# Patient Record
Sex: FEMALE | Race: WHITE | Employment: FULL TIME | URBAN - METROPOLITAN AREA
[De-identification: names, ages, dates, MRNs, and addresses within clinical notes are randomized per-mention and may not be internally consistent; named-entity substitution may affect disease eponyms.]

---

## 2021-05-21 LAB
AVERAGE GLUCOSE: 108
HBA1C MFR BLD: 5.4 %

## 2021-06-01 ENCOUNTER — OFFICE VISIT (OUTPATIENT)
Dept: FAMILY MEDICINE CLINIC | Age: 51
End: 2021-06-01
Payer: COMMERCIAL

## 2021-06-01 VITALS
HEART RATE: 73 BPM | DIASTOLIC BLOOD PRESSURE: 71 MMHG | HEIGHT: 63 IN | OXYGEN SATURATION: 98 % | WEIGHT: 288 LBS | BODY MASS INDEX: 51.03 KG/M2 | SYSTOLIC BLOOD PRESSURE: 129 MMHG

## 2021-06-01 DIAGNOSIS — Z12.31 SCREENING MAMMOGRAM, ENCOUNTER FOR: ICD-10-CM

## 2021-06-01 DIAGNOSIS — Z76.89 ESTABLISHING CARE WITH NEW DOCTOR, ENCOUNTER FOR: Primary | ICD-10-CM

## 2021-06-01 DIAGNOSIS — E66.01 CLASS 3 SEVERE OBESITY DUE TO EXCESS CALORIES WITH SERIOUS COMORBIDITY AND BODY MASS INDEX (BMI) OF 50.0 TO 59.9 IN ADULT (HCC): ICD-10-CM

## 2021-06-01 PROCEDURE — 99204 OFFICE O/P NEW MOD 45 MIN: CPT | Performed by: FAMILY MEDICINE

## 2021-06-01 RX ORDER — LIRAGLUTIDE 6 MG/ML
18 INJECTION, SOLUTION SUBCUTANEOUS DAILY
Qty: 6 PEN | Refills: 5 | Status: SHIPPED | OUTPATIENT
Start: 2021-06-01 | End: 2021-07-01

## 2021-06-01 RX ORDER — CETIRIZINE HYDROCHLORIDE 10 MG/1
10 TABLET ORAL PRN
COMMUNITY

## 2021-06-01 RX ORDER — BUMETANIDE 2 MG/1
2 TABLET ORAL DAILY PRN
COMMUNITY

## 2021-06-01 RX ORDER — CYANOCOBALAMIN 500 UG/1
SPRAY NASAL
COMMUNITY
Start: 2021-05-21

## 2021-06-01 RX ORDER — PHENTERMINE HYDROCHLORIDE 37.5 MG/1
37.5 TABLET ORAL
Qty: 30 TABLET | Refills: 0 | Status: SHIPPED | OUTPATIENT
Start: 2021-06-01 | End: 2021-06-29 | Stop reason: SDUPTHER

## 2021-06-01 RX ORDER — VITAMIN A, VITAMIN C, VITAMIN D-3, VITAMIN E, VITAMIN B-1, VITAMIN B-2, NIACIN, VITAMIN B-6, CALCIUM, IRON, ZINC, COPPER 4000; 120; 400; 22; 1.84; 3; 20; 10; 1; 12; 200; 27; 25; 2 [IU]/1; MG/1; [IU]/1; MG/1; MG/1; MG/1; MG/1; MG/1; MG/1; UG/1; MG/1; MG/1; MG/1; MG/1
TABLET ORAL
COMMUNITY
Start: 2021-05-12 | End: 2021-07-02

## 2021-06-01 RX ORDER — ERGOCALCIFEROL 1.25 MG/1
CAPSULE ORAL
COMMUNITY
Start: 2021-05-12 | End: 2021-08-27

## 2021-06-01 RX ORDER — FAMOTIDINE 20 MG/1
TABLET, FILM COATED ORAL
COMMUNITY
Start: 2021-05-12

## 2021-06-01 RX ORDER — ALPRAZOLAM 1 MG/1
TABLET ORAL
COMMUNITY
Start: 2021-04-23 | End: 2021-06-29 | Stop reason: SDUPTHER

## 2021-06-01 RX ORDER — FLUOXETINE HYDROCHLORIDE 20 MG/1
CAPSULE ORAL
COMMUNITY
Start: 2021-05-12 | End: 2021-07-02

## 2021-06-01 RX ORDER — ATORVASTATIN CALCIUM 10 MG/1
TABLET, FILM COATED ORAL
COMMUNITY
End: 2021-07-02

## 2021-06-01 SDOH — ECONOMIC STABILITY: FOOD INSECURITY: WITHIN THE PAST 12 MONTHS, YOU WORRIED THAT YOUR FOOD WOULD RUN OUT BEFORE YOU GOT MONEY TO BUY MORE.: NEVER TRUE

## 2021-06-01 SDOH — ECONOMIC STABILITY: FOOD INSECURITY: WITHIN THE PAST 12 MONTHS, THE FOOD YOU BOUGHT JUST DIDN'T LAST AND YOU DIDN'T HAVE MONEY TO GET MORE.: NEVER TRUE

## 2021-06-01 ASSESSMENT — PATIENT HEALTH QUESTIONNAIRE - PHQ9
1. LITTLE INTEREST OR PLEASURE IN DOING THINGS: 0
2. FEELING DOWN, DEPRESSED OR HOPELESS: 0
SUM OF ALL RESPONSES TO PHQ QUESTIONS 1-9: 0
SUM OF ALL RESPONSES TO PHQ9 QUESTIONS 1 & 2: 0

## 2021-06-01 ASSESSMENT — SOCIAL DETERMINANTS OF HEALTH (SDOH): HOW HARD IS IT FOR YOU TO PAY FOR THE VERY BASICS LIKE FOOD, HOUSING, MEDICAL CARE, AND HEATING?: NOT HARD AT ALL

## 2021-06-01 NOTE — PROGRESS NOTES
[facility]  [address] [place] [address] [address] [facility] [address]  Dept: [phone]      Vikki Piedra is a 45 y.o. female who presents today for follow up on her  medical conditions as noted below. Chief Complaint   Patient presents with    New Patient       There is no problem list on file for this patient. History reviewed. No pertinent past medical history. History reviewed. No pertinent surgical history. History reviewed. No pertinent family history. Current Outpatient Medications   Medication Sig Dispense Refill    ALPRAZolam (XANAX) 1 MG tablet take 1 tablet by mouth twice a day      atorvastatin (LIPITOR) 10 MG tablet 1 tablet Orally Once a day for 90 days      cetirizine (ZYRTEC) 10 MG tablet Take 10 mg by mouth as needed      bumetanide (BUMEX) 2 MG tablet Take 2 mg by mouth daily as needed      NASCOBAL 500 MCG/0.1ML SOLN nasal spray instill 1 spray into each nostril every week      vitamin D (ERGOCALCIFEROL) 1.25 MG (18793 UT) CAPS capsule       famotidine (PEPCID) 20 MG tablet       FLUoxetine (PROZAC) 20 MG capsule       metFORMIN (GLUCOPHAGE) 500 MG tablet Take 500 mg by mouth 2 times daily (with meals)      Prenatal Vit-Fe Fumarate-FA (PRENATAL VITAMIN PLUS LOW IRON) 27-1 MG TABS       CALCIUM CITRATE PO Take by mouth      SAXENDA 18 MG/3ML SOPN Inject 18 mg into the skin daily 6 pen 5    phentermine (ADIPEX-P) 37.5 MG tablet Take 1 tablet by mouth every morning (before breakfast) for 30 days. 30 tablet 0    Insulin Pen Needle (NOVOTWIST) 32G X 5 MM MISC 1 each by Does not apply route daily 100 each 3     No current facility-administered medications for this visit.      ALLERGIES:    Allergies   Allergen Reactions    Latex Rash     Other reaction(s): Unknown    Red Dye Other (See Comments)     Itching rash    Topiramate Other (See Comments)    Trovafloxacin Itching and Nausea Only    Citalopram Itching and Rash celexa     Codeine Diarrhea, Other (See Comments) and Nausea And Vomiting    Hydrocodone Hives and Rash       Social History     Tobacco Use    Smoking status: Never Smoker    Smokeless tobacco: Never Used   Substance Use Topics    Alcohol use: Not on file        No results found for: LDLCALC, LDLCHOLESTEROL, HDL, BUN, CREATININE, GLUCOSE, LABA1C, LABMICR           Subjective:      HPI  She is being seen today to establish care as a new patient. Several years ago she had gastric bypass she did lose weight and it helped with a lot of her ongoing medical problems but unfortunately she has gained some weight back and is struggling to lose she is here that there is some other things she can do to help with that including an injectable and some medication she wonders if those are a possibility for her she recently had lab work done    Review of Systems:     Constitutional: Negative for fever, appetite change and fatigue. Family social and medical history reviewed and unchanged     HENT: Negative. Negative for nosebleeds, trouble swallowing and neck pain. Eyes: Negative for photophobia and visual disturbance. Respiratory: Negative. Negative for chest tightness and shortness of breath. Cardiovascular: Negative. Negative for chest pain and leg swelling. Gastrointestinal: Negative. Negative for abdominal pain and blood in stool. Endocrine: Negative for cold intolerance and polyuria. Genitourinary: Negative for dysuria and hematuria. Musculoskeletal: Negative. Skin: Negative for rash. Allergic/Immunologic: Negative. Neurological: Negative. Negative for dizziness, weakness and numbness. Hematological: Negative. Negative for adenopathy. Does not bruise/bleed easily. Psychiatric/Behavioral: Negative for sleep disturbance, dysphoric mood and  decreased concentration. The patient is not nervous/anxious. Objective:     Physical Exam:     Nursing note and vitals reviewed.   BP 129/71   Pulse 73   Ht 5' 3\" (1.6 m)   Wt 288 lb (130.6 kg)   SpO2 98%   BMI 51.02 kg/m²   Constitutional: She is oriented to person, place, and time. She   appears well-developed and well-nourished. HENT:   Head: Normocephalic and atraumatic. Right Ear: External ear normal. Tympanic membrane is not erythematous. No middle ear effusion. Left Ear: External ear normal. Tympanic membrane is not erythematous. No middle ear effusion. Nose: No mucosal edema. Mouth/Throat: Oropharynx is clear and moist. No posterior oropharyngeal erythema. Eyes: Conjunctivae and EOM are normal. Pupils are equal, round, and reactive to light. Neck: Normal range of motion. Neck supple. No thyromegaly present. Cardiovascular: Normal rate, regular rhythm and normal heart sounds. No murmur heard. Pulmonary/Chest: Effort normal and breath sounds normal. She has no wheezes. Shehas no rales. Abdominal: Soft. Bowel sounds are normal. She exhibits no distension and no mass. There is no tenderness. There is no rebound and no guarding. Genitourinary/Anorectal:deferred  Musculoskeletal: Normal range of motion. She exhibits no edema or tenderness. Lymphadenopathy: She has no cervical adenopathy. Neurological: She is alert and oriented to person, place, and time. She has normal reflexes. Skin: Skin is warm and dry. No rash noted. Psychiatric: She has a normal mood and affect. Her   behavior is normal.       Assessment:      1. Establishing care with new doctor, encounter for    2. Screening mammogram, encounter for    3. Class 3 severe obesity due to excess calories with serious comorbidity and body mass index (BMI) of 50.0 to 59.9 in adult [facility])          Plan:      Call or return to clinic prn if these symptoms worsen or fail to improve as anticipated. I have reviewed the instructions with the patient, answering all questions to her satisfaction.     Return in about 4 weeks (around 6/29/2021), or if symptoms worsen or fail to improve, for re-check. Orders Placed This Encounter   Procedures    DAVID DIGITAL SCREEN W OR WO CAD BILATERAL     Standing Status:   Future     Standing Expiration Date:   2022     Order Specific Question:   Reason for exam:     Answer:   screen     Orders Placed This Encounter   Medications    SAXENDA 18 MG/3ML SOPN     Sig: Inject 18 mg into the skin daily     Dispense:  6 pen     Refill:  5    phentermine (ADIPEX-P) 37.5 MG tablet     Sig: Take 1 tablet by mouth every morning (before breakfast) for 30 days.      Dispense:  30 tablet     Refill:  0    Insulin Pen Needle (NOVOTWIST) 32G X 5 MM MISC     Si each by Does not apply route daily     Dispense:  100 each     Refill:  3     Discussed using appetite suppressants and how the various medications work she would like to start on those she also is willing to do the low calorie diet and start exercising  And she needs to follow-up in the office in 1 month   electronically signed by Ten Montenegro DO on 2021 at 2:33 PM

## 2021-06-29 ENCOUNTER — OFFICE VISIT (OUTPATIENT)
Dept: FAMILY MEDICINE CLINIC | Age: 51
End: 2021-06-29
Payer: COMMERCIAL

## 2021-06-29 VITALS
DIASTOLIC BLOOD PRESSURE: 67 MMHG | BODY MASS INDEX: 47.12 KG/M2 | SYSTOLIC BLOOD PRESSURE: 121 MMHG | TEMPERATURE: 97.1 F | HEART RATE: 86 BPM | WEIGHT: 276 LBS | OXYGEN SATURATION: 95 % | HEIGHT: 64 IN

## 2021-06-29 DIAGNOSIS — F41.9 ANXIETY: ICD-10-CM

## 2021-06-29 DIAGNOSIS — E66.01 CLASS 3 SEVERE OBESITY DUE TO EXCESS CALORIES WITH SERIOUS COMORBIDITY AND BODY MASS INDEX (BMI) OF 50.0 TO 59.9 IN ADULT (HCC): Primary | ICD-10-CM

## 2021-06-29 PROCEDURE — 99213 OFFICE O/P EST LOW 20 MIN: CPT | Performed by: NURSE PRACTITIONER

## 2021-06-29 RX ORDER — PHENTERMINE HYDROCHLORIDE 37.5 MG/1
37.5 TABLET ORAL
Qty: 30 TABLET | Refills: 0 | Status: SHIPPED | OUTPATIENT
Start: 2021-06-29 | End: 2021-07-27 | Stop reason: SDUPTHER

## 2021-06-29 RX ORDER — ALPRAZOLAM 1 MG/1
TABLET ORAL
Qty: 30 TABLET | Refills: 0 | Status: SHIPPED | OUTPATIENT
Start: 2021-06-29 | End: 2021-08-02 | Stop reason: SDUPTHER

## 2021-06-29 ASSESSMENT — ENCOUNTER SYMPTOMS
ABDOMINAL PAIN: 0
DIARRHEA: 0
SHORTNESS OF BREATH: 0
CONSTIPATION: 0
SINUS PAIN: 0
COLOR CHANGE: 0
SINUS PRESSURE: 0
CHEST TIGHTNESS: 0

## 2021-06-29 NOTE — PROGRESS NOTES
Beto Garcia is a 45 y.o. female who presents in office today with Self medication specific follow up    Chief Complaint   Patient presents with    1 Month Follow-Up    Ear Fullness     possible allergies        History of Present Illness:     HPI    Here for Adipex follow up. Has lost 12 lbs. Changing diet, not craving junk food as much. Had some jittery feelings the first week but has worn off. She is only drinking water. Less fast food, eating more at home, and getting christensen faster. Was prescribed Saxenda but costs around $500. Wondering about PA. Knee surgery in 2 weeks - 7/14/21. Allergy, ear fullness. Taking xyzal and saline nasal spray. Has flonase at home, just hasn't used recently. Care gaps: COVID-19 vaccine:  Pfizer February and March  Colon CA screening:  Vaccines:   Hepatitis C/HIV screens:   Breast CA screening:    Appointment scheduled  OB/GYN, cervical CA screening:   Appointment scheduled    Health Maintenance Due   Topic Date Due    Hepatitis C screen  Never done    HIV screen  Never done    DTaP/Tdap/Td vaccine (2 - Td or Tdap) 12/22/2018    Breast cancer screen  Never done    Shingles Vaccine (1 of 2) Never done    Colon cancer screen colonoscopy  Never done        Patient Care Team:  Booker Guerrier DO as PCP - General (Family Medicine)  Booker Guerrier DO as PCP - [facility] Empaneled Provider    Reviewed     [x] Past Medical, Family, and Social History was reviewed per writer and does contribute to the patient presenting condition.     [x] Laboratory Results, Vital signs, Imaging, Active Problems, Immunizations, Current/Recently Discontinued Medications, Health Maintenance Activities Due, Referral Notes (if available) were reviewed per writer     [x] Reviewed Depression screening if taken or valid today or any other valid screening tool (others seen below) Interpretation of Total Score DepressionSeverity: 1-4 = Minimal depression, 5-9 = Mild depression, 10-14 = Moderate are moist.      Pharynx: Oropharynx is clear. Eyes:      Extraocular Movements: Extraocular movements intact. Conjunctiva/sclera: Conjunctivae normal.      Pupils: Pupils are equal, round, and reactive to light. Cardiovascular:      Rate and Rhythm: Normal rate and regular rhythm. Pulses: Normal pulses. Heart sounds: Normal heart sounds. Pulmonary:      Effort: Pulmonary effort is normal. No respiratory distress. Abdominal:      General: Bowel sounds are normal.      Palpations: Abdomen is soft. Musculoskeletal:         General: No swelling. Normal range of motion. Cervical back: Normal range of motion and neck supple. Skin:     General: Skin is warm and dry. Capillary Refill: Capillary refill takes less than 2 seconds. Neurological:      General: No focal deficit present. Mental Status: She is alert and oriented to person, place, and time. Psychiatric:         Mood and Affect: Mood normal.         Behavior: Behavior normal.         Thought Content: Thought content normal.         Judgment: Judgment normal.         Diagnoses / Plan:   1. Class 3 severe obesity due to excess calories with serious comorbidity and body mass index (BMI) of 50.0 to 59.9 in adult (HCC)  -     phentermine (ADIPEX-P) 37.5 MG tablet; Take 1 tablet by mouth every morning (before breakfast) for 30 days. , Disp-30 tablet, R-0Normal  2. Anxiety  -     ALPRAZolam (XANAX) 1 MG tablet; take 1 tablet by mouth daily, Disp-30 tablet, R-0Normal     Discussed resuming flonase for ear pressure. Encouraged healthy diet and exercise. Call office with any new or worsening symptoms or concerns. Return in about 4 weeks (around 7/27/2021) for Med Check, weight check. Electronically signed by VAL Lebron CNP on 6/29/2021 at 3:03 PM    Note is dictated utilizing voice recognition software. Unfortunately this leads to occasional typographical errors.  Please contact our office if you have any questions.

## 2021-07-02 RX ORDER — FERROUS SULFATE 325(65) MG
TABLET ORAL
Qty: 12 TABLET | Refills: 0 | Status: SHIPPED | OUTPATIENT
Start: 2021-07-02

## 2021-07-02 RX ORDER — LEVOTHYROXINE SODIUM 0.05 MG/1
TABLET ORAL
Qty: 126 TABLET | Refills: 0 | Status: SHIPPED | OUTPATIENT
Start: 2021-07-02 | End: 2021-07-28

## 2021-07-02 RX ORDER — FLUOXETINE HYDROCHLORIDE 20 MG/1
CAPSULE ORAL
Qty: 84 CAPSULE | Refills: 0 | Status: SHIPPED | OUTPATIENT
Start: 2021-07-02 | End: 2021-07-26

## 2021-07-02 RX ORDER — ATORVASTATIN CALCIUM 10 MG/1
TABLET, FILM COATED ORAL
Qty: 28 TABLET | Refills: 0 | Status: SHIPPED | OUTPATIENT
Start: 2021-07-02 | End: 2021-07-28

## 2021-07-02 RX ORDER — VITAMIN A, VITAMIN C, VITAMIN D-3, VITAMIN E, VITAMIN B-1, VITAMIN B-2, NIACIN, VITAMIN B-6, CALCIUM, IRON, ZINC, COPPER 4000; 120; 400; 22; 1.84; 3; 20; 10; 1; 12; 200; 27; 25; 2 [IU]/1; MG/1; [IU]/1; MG/1; MG/1; MG/1; MG/1; MG/1; MG/1; UG/1; MG/1; MG/1; MG/1; MG/1
TABLET ORAL
Qty: 28 TABLET | Refills: 0 | Status: SHIPPED | OUTPATIENT
Start: 2021-07-02 | End: 2021-07-26

## 2021-07-12 ENCOUNTER — TELEPHONE (OUTPATIENT)
Dept: FAMILY MEDICINE CLINIC | Age: 51
End: 2021-07-12

## 2021-07-12 DIAGNOSIS — Z01.818 PRE-OP TESTING: Primary | ICD-10-CM

## 2021-07-12 NOTE — TELEPHONE ENCOUNTER
patient needs clearance for surgery  Dr Maria Teresa Garces office  Surgery was schedule for 7/14/21 was moved until 7/21/21 at [facility] . Per Dr Enmanuel Carreon patient needs chest xray, urine mrsa , done before she is cleared. Patient was informed will  get testing done tomorrow. All orders faxed to the wellness center on [facility].   Clearance scan in epic.    Lab orders faxed to [phone]  Radiology  Faxed to [phone]

## 2021-07-13 ENCOUNTER — TELEPHONE (OUTPATIENT)
Dept: FAMILY MEDICINE CLINIC | Age: 51
End: 2021-07-13

## 2021-07-14 DIAGNOSIS — Z01.818 PRE-OP TESTING: ICD-10-CM

## 2021-07-15 DIAGNOSIS — Z01.818 PRE-OP TESTING: ICD-10-CM

## 2021-07-27 ENCOUNTER — TELEMEDICINE (OUTPATIENT)
Dept: FAMILY MEDICINE CLINIC | Age: 51
End: 2021-07-27
Payer: COMMERCIAL

## 2021-07-27 DIAGNOSIS — E03.9 ACQUIRED HYPOTHYROIDISM: Primary | ICD-10-CM

## 2021-07-27 DIAGNOSIS — E66.01 CLASS 3 SEVERE OBESITY DUE TO EXCESS CALORIES WITH SERIOUS COMORBIDITY AND BODY MASS INDEX (BMI) OF 50.0 TO 59.9 IN ADULT (HCC): ICD-10-CM

## 2021-07-27 PROCEDURE — 99213 OFFICE O/P EST LOW 20 MIN: CPT | Performed by: FAMILY MEDICINE

## 2021-07-27 RX ORDER — PHENTERMINE HYDROCHLORIDE 37.5 MG/1
37.5 TABLET ORAL
Qty: 30 TABLET | Refills: 0 | Status: SHIPPED | OUTPATIENT
Start: 2021-07-27 | End: 2021-08-26

## 2021-07-27 ASSESSMENT — PATIENT HEALTH QUESTIONNAIRE - PHQ9
2. FEELING DOWN, DEPRESSED OR HOPELESS: 0
SUM OF ALL RESPONSES TO PHQ9 QUESTIONS 1 & 2: 0
SUM OF ALL RESPONSES TO PHQ QUESTIONS 1-9: 0
SUM OF ALL RESPONSES TO PHQ QUESTIONS 1-9: 0
1. LITTLE INTEREST OR PLEASURE IN DOING THINGS: 0
SUM OF ALL RESPONSES TO PHQ QUESTIONS 1-9: 0

## 2021-07-27 NOTE — PROGRESS NOTES
2021    TELEHEALTH EVALUATION -- Audio/Visual (During ZNEYO-69 public health emergency)    HPI:    Brodie Tavares (:  1970) has requested an audio/video evaluation for the following concern(s):    Being seen today for couple issues 1 thyroid disease she has not had her labs done and sometimes in and she wanted a refill on meds she her last laboratory studies were done in  she takes 4-1/2 Synthroid 50 mcg  She also wanted a refill of Xanax but she got her last refill for 30pills and  she states she only takes it to help her sleep previous she had been taking 1 mg twice daily she recently had knee surgery done and is also be taking pain mild pills she also was doing a refill diet meds because she had started on that process and in order to continue her 3months wanted to get that refill and she will start working on that when she can get more active with physical therapy    Review of Systems    Prior to Visit Medications    Medication Sig Taking? Authorizing Provider   phentermine (ADIPEX-P) 37.5 MG tablet Take 1 tablet by mouth every morning (before breakfast) for 30 days.  Yes Camelia Cagle, DO   Prenatal Vit-Fe Fumarate-FA (PRENATAL VITAMIN PLUS LOW IRON) 27-1 MG TABS TAKE 1 TABLET AT BEDTIME  Camelia Cagle DO   FLUoxetine (PROZAC) 20 MG capsule TAKE (3) CAPSULES EVERY MORNING  Camelia Cagle DO   levothyroxine (SYNTHROID) 50 MCG tablet TAKE 4 & 1/2 TABLETS EVERY MORNING  Camelia Cagle DO   atorvastatin (LIPITOR) 10 MG tablet TAKE 1 TABLET AT BEDTIME  Camelia Cagle DO   FEROSUL 325 (65 Fe) MG tablet TAKE 1 TABLET THREE TIMES A WEEK  Camelia Cagle DO   ALPRAZolam (XANAX) 1 MG tablet take 1 tablet by mouth daily  Morna Blizzard, APRN - CNP   cetirizine (ZYRTEC) 10 MG tablet Take 10 mg by mouth as needed  Historical Provider, MD   bumetanide (BUMEX) 2 MG tablet Take 2 mg by mouth daily as needed  Historical Provider, MD   NASCOBAL 500 MCG/0.1ML SOLN nasal spray instill 1 spray into each nostril Normocephalic, atraumatic. [] Abnormal   [x] Mouth/Throat: Mucous membranes are moist.     External Ears [x] Normal  [] Abnormal-     Neck: [x] No visualized mass     Pulmonary/Chest: [x] Respiratory effort normal.  [x] No visualized signs of difficulty breathing or respiratory distress        [] Abnormal-      Musculoskeletal:   [x] Normal gait with no signs of ataxia         [x] Normal range of motion of neck        [] Abnormal-       Neurological:        [x] No Facial Asymmetry (Cranial nerve 7 motor function) (limited exam to video visit)          [x] No gaze palsy        [] Abnormal-         Skin:        [x] No significant exanthematous lesions or discoloration noted on facial skin         [] Abnormal-            Psychiatric:       [x] Normal Affect [x] No Hallucinations        [] Abnormal-     Other pertinent observable physical exam findings-     ASSESSMENT/PLAN:   Diagnosis Orders   1. Acquired hypothyroidism  T4, Free    TSH without Reflex    Thyroid Peroxidase Antibody   2. Class 3 severe obesity due to excess calories with serious comorbidity and body mass index (BMI) of 50.0 to 59.9 in adult ([facility])  phentermine (ADIPEX-P) 37.5 MG tablet      Orders Placed This Encounter   Procedures    T4, Free    TSH without Reflex    Thyroid Peroxidase Antibody     Requested Prescriptions     Signed Prescriptions Disp Refills    phentermine (ADIPEX-P) 37.5 MG tablet 30 tablet 0     Sig: Take 1 tablet by mouth every morning (before breakfast) for 30 days. No follow-ups on file. Jg Pineda is a 45 y.o. female being evaluated by a Virtual Visit (video visit) encounter to address concerns as mentioned above. A caregiver was present when appropriate. Due to this being a TeleHealth encounter (During CADOV-71 public health emergency), evaluation of the following organ systems was limited: Vitals/Constitutional/EENT/Resp/CV/GI//MS/Neuro/Skin/Heme-Lymph-Imm.   Pursuant to the emergency declaration under the [facility] [address] waiver authority and the [employer] and [employer] Act, this Virtual Visit was conducted with patient's (and/or legal guardian's) consent, to reduce the patient's risk of exposure to COVID-19 and provide necessary medical care. The patient (and/or legal guardian) has also been advised to contact this office for worsening conditions or problems, and seek emergency medical treatment and/or call 911 if deemed necessary. Patient identification was verified at the start of the visit: Yes    Total time spent on this encounter: Not billed by time    Services were provided through a video synchronous discussion virtually to substitute for in-person clinic visit. Patient and provider were located at their individual homes. --William Trevino DO on 7/27/2021 at 10:25 AM    An electronic signature was used to authenticate this note.

## 2021-07-28 LAB
T4 FREE: NORMAL
TSH SERPL DL<=0.05 MIU/L-ACNC: NORMAL M[IU]/L

## 2021-07-28 RX ORDER — ATORVASTATIN CALCIUM 10 MG/1
TABLET, FILM COATED ORAL
Qty: 28 TABLET | Refills: 0 | Status: SHIPPED | OUTPATIENT
Start: 2021-07-28 | End: 2021-08-23

## 2021-07-28 RX ORDER — LEVOTHYROXINE SODIUM 0.05 MG/1
TABLET ORAL
Qty: 126 TABLET | Refills: 0 | Status: SHIPPED | OUTPATIENT
Start: 2021-07-28 | End: 2021-07-31 | Stop reason: SDUPTHER

## 2021-07-29 DIAGNOSIS — E03.9 ACQUIRED HYPOTHYROIDISM: ICD-10-CM

## 2021-07-30 ENCOUNTER — TELEPHONE (OUTPATIENT)
Dept: FAMILY MEDICINE CLINIC | Age: 51
End: 2021-07-30

## 2021-07-30 NOTE — TELEPHONE ENCOUNTER
Patient calling for thyroid results because pharmacy is asking for refills and she wants to make sure its the right dosage she should be taken Please advise.

## 2021-07-31 RX ORDER — LEVOTHYROXINE SODIUM 0.05 MG/1
200 TABLET ORAL DAILY
Qty: 120 TABLET | Refills: 11 | Status: SHIPPED | OUTPATIENT
Start: 2021-07-31 | End: 2021-08-02 | Stop reason: SDUPTHER

## 2021-08-02 DIAGNOSIS — F41.9 ANXIETY: ICD-10-CM

## 2021-08-02 RX ORDER — ALPRAZOLAM 1 MG/1
TABLET ORAL
Qty: 30 TABLET | Refills: 0 | Status: SHIPPED | OUTPATIENT
Start: 2021-08-02 | End: 2021-09-16

## 2021-08-02 RX ORDER — LEVOTHYROXINE SODIUM 0.05 MG/1
200 TABLET ORAL DAILY
Qty: 120 TABLET | Refills: 11 | Status: SHIPPED | OUTPATIENT
Start: 2021-08-02 | End: 2022-06-24

## 2021-08-02 NOTE — TELEPHONE ENCOUNTER
Patient stated she needs a new prescription sent over for her synthoid with the new directions          [facility] pharmacy        [phone]

## 2021-08-02 NOTE — TELEPHONE ENCOUNTER
Patient wants to know did you call in a new prescription for her synthroid. Patient stated she is no longer taking her pain medication anymore for her knee,      She wants to know can you refill her   Xanax.       Please advise

## 2021-08-05 ENCOUNTER — PATIENT MESSAGE (OUTPATIENT)
Dept: FAMILY MEDICINE CLINIC | Age: 51
End: 2021-08-05

## 2021-08-05 NOTE — TELEPHONE ENCOUNTER
From: Myles Das  To: Hetal Hayden DO  Sent: 8/5/2021 12:40 AM EDT  Subject: Prescription Question    I had my total knee surgery on July 21. Dr. Delia Souza told me to check with you to see when or if it is OK to start taking my phetermine again and take my butemide/bumped for water retention as needed.

## 2021-08-27 RX ORDER — ERGOCALCIFEROL 1.25 MG/1
CAPSULE ORAL
Qty: 12 CAPSULE | Refills: 0 | Status: SHIPPED | OUTPATIENT
Start: 2021-08-27 | End: 2021-09-20

## 2021-08-27 NOTE — TELEPHONE ENCOUNTER
Gabriel Castro is calling to request a refill on the following medication(s):    Last Visit Date (If Applicable):  8/21/5082    Next Visit Date:    Visit date not found    Medication Request:  Requested Prescriptions     Pending Prescriptions Disp Refills    vitamin D (ERGOCALCIFEROL) 1.25 MG (48260 UT) CAPS capsule [Pharmacy Med Name: VITAMIN D2 1.25MG(50,000 UNIT)] 12 capsule 0     Sig: TAKE (1) CAPSULE ON MONDAY, [address]

## 2021-09-13 DIAGNOSIS — F41.9 ANXIETY: ICD-10-CM

## 2021-09-14 NOTE — TELEPHONE ENCOUNTER
Kar Jaramillo is calling to request a refill on the following medication(s):    Last Visit Date (If Applicable):  0/84/5526    Next Visit Date:    Visit date not found    Medication Request:  Requested Prescriptions     Pending Prescriptions Disp Refills    ALPRAZolam (XANAX) 1 MG tablet [Pharmacy Med Name: ALPRAZOLAM 1 MG TABLET] 30 tablet      Sig: take 1 tablet by mouth once daily

## 2021-09-16 RX ORDER — ALPRAZOLAM 1 MG/1
TABLET ORAL
Qty: 30 TABLET | Refills: 0 | Status: SHIPPED | OUTPATIENT
Start: 2021-09-16 | End: 2021-10-16

## 2021-09-20 RX ORDER — ERGOCALCIFEROL 1.25 MG/1
CAPSULE ORAL
Qty: 12 CAPSULE | Refills: 0 | Status: SHIPPED | OUTPATIENT
Start: 2021-09-20 | End: 2021-10-18

## 2021-09-20 NOTE — TELEPHONE ENCOUNTER
Myles Das is calling to request a refill on the following medication(s):    Last Visit Date (If Applicable):  1/26/3490    Next Visit Date:    Visit date not found    Medication Request:  Requested Prescriptions     Pending Prescriptions Disp Refills    vitamin D (ERGOCALCIFEROL) 1.25 MG (47633 UT) CAPS capsule [Pharmacy Med Name: VITAMIN D2 1.25MG(50,000 UNIT)] 12 capsule 0     Sig: TAKE (1) CAPSULE ON MONDAY, [address].

## 2021-09-24 NOTE — TELEPHONE ENCOUNTER
Tali Gongora is calling to request a refill on the following medication(s):    Last Visit Date (If Applicable):  8/02/7596    Next Visit Date:    Visit date not found    Medication Request:  Requested Prescriptions     Pending Prescriptions Disp Refills    metFORMIN (GLUCOPHAGE) 500 MG tablet [Pharmacy Med Name: METFORMIN  MG TABLET] 56 tablet 0     Sig: TAKE (1) TABLET EVERY MORNING AND BEDTIME

## 2021-11-11 DIAGNOSIS — Z12.31 SCREENING MAMMOGRAM, ENCOUNTER FOR: ICD-10-CM

## 2021-11-19 DIAGNOSIS — F41.9 ANXIETY: Primary | ICD-10-CM

## 2021-11-19 RX ORDER — ALPRAZOLAM 1 MG/1
1 TABLET ORAL NIGHTLY PRN
Qty: 30 TABLET | Refills: 0 | Status: SHIPPED | OUTPATIENT
Start: 2021-11-19 | End: 2021-12-28

## 2021-11-19 NOTE — TELEPHONE ENCOUNTER
Gayathri Patel is calling to request a refill on the following medication(s):    Last Visit Date (If Applicable):  1/35/7629    Next Visit Date:    Visit date not found    Medication Request:  Requested Prescriptions     Pending Prescriptions Disp Refills    ALPRAZolam (XANAX) 1 MG tablet 30 tablet 0     Sig: Take 1 tablet by mouth nightly as needed for Sleep for up to 30 days.

## 2022-01-24 DIAGNOSIS — F41.9 ANXIETY: ICD-10-CM

## 2022-01-24 RX ORDER — ALPRAZOLAM 1 MG/1
TABLET ORAL
Qty: 30 TABLET | Refills: 0 | Status: SHIPPED | OUTPATIENT
Start: 2022-01-24 | End: 2022-02-26 | Stop reason: SDUPTHER

## 2022-01-24 NOTE — TELEPHONE ENCOUNTER
Pharm requested    Health Maintenance   Topic Date Due    Hepatitis C screen  Never done    HIV screen  Never done    Colon cancer screen colonoscopy  Never done    DTaP/Tdap/Td vaccine (2 - Td or Tdap) 12/22/2018    Shingles Vaccine (1 of 2) Never done    COVID-19 Vaccine (3 - Booster for Pfizer series) 08/06/2021    Flu vaccine (1) 09/01/2021    Potassium monitoring  09/12/2021    Creatinine monitoring  09/12/2021    Lipid screen  05/21/2022    Depression Screen  07/27/2022    Cervical cancer screen  09/10/2023    Breast cancer screen  11/04/2023    Diabetes screen  05/21/2024    Hepatitis A vaccine  Aged Out    Hepatitis B vaccine  Aged Out    Hib vaccine  Aged Out    Meningococcal (ACWY) vaccine  Aged Out    Pneumococcal 0-64 years Vaccine  Aged Out             (applicable per patient's age: Cancer Screenings, Depression Screening, Fall Risk Screening, Immunizations)    Hemoglobin A1C (%)   Date Value   05/21/2021 5.4      (goal A1C is < 7)   (goal LDL is <100) need 30-50% reduction from baseline     BP Readings from Last 3 Encounters:   06/29/21 121/67   06/01/21 129/71    (goal /80)      All Future Testing planned in [facility]:      Next Visit Date:  No future appointments.          Patient Active Problem List:     Anxiety     Morbid obesity ([address]

## 2022-01-27 ENCOUNTER — TELEMEDICINE (OUTPATIENT)
Dept: FAMILY MEDICINE CLINIC | Age: 52
End: 2022-01-27
Payer: COMMERCIAL

## 2022-01-27 DIAGNOSIS — R53.83 FATIGUE, UNSPECIFIED TYPE: ICD-10-CM

## 2022-01-27 DIAGNOSIS — E03.9 ACQUIRED HYPOTHYROIDISM: Primary | ICD-10-CM

## 2022-01-27 DIAGNOSIS — E66.01 CLASS 3 SEVERE OBESITY DUE TO EXCESS CALORIES WITH SERIOUS COMORBIDITY AND BODY MASS INDEX (BMI) OF 50.0 TO 59.9 IN ADULT (HCC): ICD-10-CM

## 2022-01-27 DIAGNOSIS — F51.01 PRIMARY INSOMNIA: ICD-10-CM

## 2022-01-27 PROCEDURE — 99214 OFFICE O/P EST MOD 30 MIN: CPT | Performed by: FAMILY MEDICINE

## 2022-01-27 RX ORDER — PHENTERMINE HYDROCHLORIDE 37.5 MG/1
37.5 TABLET ORAL
Qty: 30 TABLET | Refills: 0 | Status: SHIPPED | OUTPATIENT
Start: 2022-01-27 | End: 2022-02-24 | Stop reason: SDUPTHER

## 2022-01-27 RX ORDER — SEMAGLUTIDE 0.25 MG/.5ML
0.25 INJECTION, SOLUTION SUBCUTANEOUS
Qty: 2 ML | Refills: 1 | Status: SHIPPED | OUTPATIENT
Start: 2022-01-27 | End: 2022-02-24

## 2022-01-27 ASSESSMENT — PATIENT HEALTH QUESTIONNAIRE - PHQ9
SUM OF ALL RESPONSES TO PHQ QUESTIONS 1-9: 0
SUM OF ALL RESPONSES TO PHQ QUESTIONS 1-9: 0
2. FEELING DOWN, DEPRESSED OR HOPELESS: 0
SUM OF ALL RESPONSES TO PHQ9 QUESTIONS 1 & 2: 0
SUM OF ALL RESPONSES TO PHQ QUESTIONS 1-9: 0
1. LITTLE INTEREST OR PLEASURE IN DOING THINGS: 0
SUM OF ALL RESPONSES TO PHQ QUESTIONS 1-9: 0

## 2022-01-27 NOTE — PROGRESS NOTES
2022    TELEHEALTH EVALUATION -- Audio/Visual (During [facility]-04 public health emergency)    HPI:    Roxanna Piedra (:  1970) has requested an audio/video evaluation for the following concern(s):    Being seen today with multiple issues she would like to go back on something to help her with weight management she tried Adipex in the past and it did help her she also wanted Raynald [place] but it was way too expensive but she heard there is a new drug on the market that is injectable and she would like to try that  She states she is been really tired lately and she would like to get her thyroid checked I did decrease her dose because she her TSH was way too low and she never followed up on getting that rechecked  She does take a Xanax every night at bed to help her sleep and wondered if she could get refills on it so she does not have the pulmonary month      Review of Systems    Prior to Visit Medications    Medication Sig Taking? Authorizing Provider   phentermine (ADIPEX-P) 37.5 MG tablet Take 1 tablet by mouth every morning (before breakfast) for 30 days. Yes Camelia Cagle DO   WEGOVY 0.25 MG/0.5ML SOAJ SC injection Inject 0.25 mg into the skin every 7 days Yes Camelia Cagle DO   ALPRAZolam (XANAX) 1 MG tablet take 1 tablet by mouth at bedtime if needed for sleep  Camelia Cagle DO   clindamycin (CLEOCIN T) 1 % external solution Apply 1 UNSPECIFIED topically 2 times daily  Christiano Perez MD   metFORMIN (GLUCOPHAGE) 500 MG tablet TAKE (1) TABLET EVERY MORNING AND BEDTIME  Camelia Cagle DO   vitamin D (ERGOCALCIFEROL) 1.25 MG (97262 UT) CAPS capsule TAKE (1) CAPSULE ON MONDAY, [address]   Camelia Cagle DO   atorvastatin (LIPITOR) 10 MG tablet TAKE 1 TABLET AT BEDTIME  Camelia Cagle DO   Prenatal Vit-Fe Fumarate-FA (PRENATAL VITAMIN PLUS LOW IRON) 27-1 MG TABS TAKE 1 TABLET AT BEDTIME  Camelia Cagle DO   FLUoxetine (PROZAC) 20 MG capsule TAKE (3) CAPSULES EVERY MORNING  Camelia Cagle DO levothyroxine (SYNTHROID) 50 MCG tablet Take 4 tablets by mouth Daily  Camelia Cagle DO   FEROSUL 325 (65 Fe) MG tablet TAKE 1 TABLET THREE TIMES A WEEK  Camelia Cagle DO   cetirizine (ZYRTEC) 10 MG tablet Take 10 mg by mouth as needed  Historical Provider, MD   bumetanide (BUMEX) 2 MG tablet Take 2 mg by mouth daily as needed  Historical Provider, MD   NASCOBAL 500 MCG/0.1ML SOLN nasal spray instill 1 spray into each nostril every week  Historical Provider, MD   famotidine (PEPCID) 20 MG tablet   Historical Provider, MD   CALCIUM CITRATE PO Take by mouth  Historical Provider, MD   Insulin Pen Needle (NOVOTWIST) 32G X 5 MM MISC 1 each by Does not apply route daily  Camelia Cagle DO       Social History     Tobacco Use    Smoking status: Never Smoker    Smokeless tobacco: Never Used   Substance Use Topics    Alcohol use: Not on file    Drug use: Not on file        Allergies   Allergen Reactions    Latex Rash     Other reaction(s): Unknown    Red Dye Other (See Comments)     Itching rash    Topiramate Other (See Comments)    Trovafloxacin Itching and Nausea Only    Citalopram Itching and Rash     celexa     Codeine Diarrhea, Other (See Comments) and Nausea And Vomiting    Hydrocodone Hives and Rash   , History reviewed. No pertinent past medical history. , History reviewed. No pertinent surgical history. ,   Social History     Tobacco Use    Smoking status: Never Smoker    Smokeless tobacco: Never Used   Substance Use Topics    Alcohol use: Not on file    Drug use: Not on file       PHYSICAL EXAMINATION:  [ INSTRUCTIONS:  \"[x]\" Indicates a positive item  \"[]\" Indicates a negative item  -- DELETE ALL ITEMS NOT EXAMINED]  Vital Signs: (As obtained by patient/caregiver or practitioner observation)    Blood pressure-  Heart rate-    Respiratory rate-    Temperature-  Pulse oximetry-     Constitutional: [x] Appears well-developed and well-nourished [x] No apparent distress      [] Abnormal-   Mental status  [x] Alert and awake  [x] Oriented to person/place/time [x]Able to follow commands      Eyes:  EOM    [x]  Normal  [] Abnormal-  Sclera  [x]  Normal  [] Abnormal -         Discharge [x]  None visible  [] Abnormal -    HENT:   [x] Normocephalic, atraumatic. [] Abnormal   [x] Mouth/Throat: Mucous membranes are moist.     External Ears [x] Normal  [] Abnormal-     Neck: [x] No visualized mass     Pulmonary/Chest: [x] Respiratory effort normal.  [x] No visualized signs of difficulty breathing or respiratory distress        [] Abnormal-      Musculoskeletal:   [x] Normal gait with no signs of ataxia         [x] Normal range of motion of neck        [] Abnormal-       Neurological:        [x] No Facial Asymmetry (Cranial nerve 7 motor function) (limited exam to video visit)          [x] No gaze palsy        [] Abnormal-         Skin:        [x] No significant exanthematous lesions or discoloration noted on facial skin         [] Abnormal-            Psychiatric:       [x] Normal Affect [x] No Hallucinations        [] Abnormal-     Other pertinent observable physical exam findings-     ASSESSMENT/PLAN:   Diagnosis Orders   1. Acquired hypothyroidism  T4, Free    TSH without Reflex    CBC Auto Differential   2. Class 3 severe obesity due to excess calories with serious comorbidity and body mass index (BMI) of 50.0 to 59.9 in adult (HCC)  phentermine (ADIPEX-P) 37.5 MG tablet    WEGOVY 0.25 MG/0.5ML SOAJ SC injection   3. Fatigue, unspecified type  CBC Auto Differential   4. Primary insomnia            Address all the issues with the patient  Discussed all the medications with the patient put in the lab order for her to check for reasons of fatigue  Discussed risk side effects benefits of using all the medications  She is to follow-up in the office or virtually in 4 weeks follow calorie diet and exercise  Return in about 4 weeks (around 2/24/2022) for re-check.     Donta Doyle is a 45 y.o. female being evaluated by a Virtual Visit (video visit) encounter to address concerns as mentioned above. A caregiver was present when appropriate. Due to this being a TeleHealth encounter (During [facility]-81 public health emergency), evaluation of the following organ systems was limited: Vitals/Constitutional/EENT/Resp/CV/GI//MS/Neuro/Skin/Heme-Lymph-Imm. Pursuant to the emergency declaration under the [address] [place] [address] and the [employer] and [employer] Act, this Virtual Visit was conducted with patient's (and/or legal guardian's) consent, to reduce the patient's risk of exposure to COVID-19 and provide necessary medical care. The patient (and/or legal guardian) has also been advised to contact this office for worsening conditions or problems, and seek emergency medical treatment and/or call 911 if deemed necessary. Patient identification was verified at the start of the visit: Yes    Total time spent on this encounter: Not billed by time    Services were provided through a video synchronous discussion virtually to substitute for in-person clinic visit. Patient and provider were located at their individual homes. --Vasquez Salcedo DO on 1/27/2022 at 5:08 PM    An electronic signature was used to authenticate this note.

## 2022-02-09 ENCOUNTER — TELEPHONE (OUTPATIENT)
Dept: FAMILY MEDICINE CLINIC | Age: 52
End: 2022-02-09

## 2022-02-09 NOTE — TELEPHONE ENCOUNTER
----- Message from Ellen Simmons sent at 2/9/2022  2:35 PM EST -----  Subject: Medication Problem    QUESTIONS  Name of Medication? WEGOVY 0.25 MG/0.5ML SOAJ SC injection  Patient-reported dosage and instructions? Inject 0.25 mg into the skin   every 7 days  What question or problem do you have with the medication? Pt states her   insurance is not covering the medication and they would need an appeal   letter sent. The appeal letter can be sent to [employer] at [facility] [phone] attention? Benefit Coverage Review   Department. Preferred Pharmacy? [facility] [address] [facility] [phone]  Pharmacy phone number (if available)? [phone]  Additional Information for Provider? Pt states the provider could also   call [employer] at [phone] ask for added coverage review to   explain why medication is needed. Pt would also like to receive a call   back with a status update.  ---------------------------------------------------------------------------  --------------  CALL BACK INFO  What is the best way for the office to contact you? OK to leave message on   voicemail  Preferred Call Back Phone Number? [phone]  ---------------------------------------------------------------------------  --------------  SCRIPT ANSWERS  Relationship to Patient?  Self

## 2022-02-11 NOTE — TELEPHONE ENCOUNTER
She needs to download the new co-pay card off of the Internet and take it to the pharmacy and the pharmacy has to run it with 03 code these patients are not doing that and the pharmacy is not done running the correct code.   Insurances will not cover this medication

## 2022-02-24 ENCOUNTER — TELEMEDICINE (OUTPATIENT)
Dept: FAMILY MEDICINE CLINIC | Age: 52
End: 2022-02-24
Payer: COMMERCIAL

## 2022-02-24 DIAGNOSIS — E03.9 ACQUIRED HYPOTHYROIDISM: Primary | ICD-10-CM

## 2022-02-24 DIAGNOSIS — E66.01 CLASS 3 SEVERE OBESITY DUE TO EXCESS CALORIES WITH SERIOUS COMORBIDITY AND BODY MASS INDEX (BMI) OF 50.0 TO 59.9 IN ADULT (HCC): ICD-10-CM

## 2022-02-24 PROCEDURE — 99213 OFFICE O/P EST LOW 20 MIN: CPT | Performed by: FAMILY MEDICINE

## 2022-02-24 RX ORDER — PHENTERMINE HYDROCHLORIDE 37.5 MG/1
37.5 TABLET ORAL
Qty: 30 TABLET | Refills: 0 | Status: SHIPPED | OUTPATIENT
Start: 2022-02-24 | End: 2022-09-21 | Stop reason: SDUPTHER

## 2022-02-24 NOTE — PROGRESS NOTES
2022    TELEHEALTH EVALUATION -- Audio/Visual (During [facility]-82 public health emergency)    HPI:    Roxanna Piedra (:  1970) has requested an audio/video evaluation for the following concern(s):    She is being seen today for follow-up on hypothyroidism and weight management she has gotten very well over the last couple weeks but she got sick and she is currently on antibiotics and steroids  She is not really having any other issues today    Review of Systems    Prior to Visit Medications    Medication Sig Taking? Authorizing Provider   phentermine (ADIPEX-P) 37.5 MG tablet Take 1 tablet by mouth every morning (before breakfast) for 30 days. Yes Camelia Cagle DO   WEGOVY 0.25 MG/0.5ML SOAJ SC injection Inject 0.25 mg into the skin every 7 days  Camelia Cagle DO   clindamycin (CLEOCIN T) 1 % external solution Apply 1 UNSPECIFIED topically 2 times daily  Christiano Perez MD   metFORMIN (GLUCOPHAGE) 500 MG tablet TAKE (1) TABLET EVERY MORNING AND BEDTIME  Camelia Cagle DO   vitamin D (ERGOCALCIFEROL) 1.25 MG (58620 UT) CAPS capsule TAKE (1) CAPSULE ON MONDAY, WEDNESDAY & FRIDAY.   Camelia Cagle DO   atorvastatin (LIPITOR) 10 MG tablet TAKE 1 TABLET AT BEDTIME  Camelia Cagle DO   Prenatal Vit-Fe Fumarate-FA (PRENATAL VITAMIN PLUS LOW IRON) 27-1 MG TABS TAKE 1 TABLET AT BEDTIME  Camelia Cagle DO   FLUoxetine (PROZAC) 20 MG capsule TAKE (3) CAPSULES EVERY MORNING  Camelia Cagle DO   levothyroxine (SYNTHROID) 50 MCG tablet Take 4 tablets by mouth Daily  Camelia Cagle DO   FEROSUL 325 (65 Fe) MG tablet TAKE 1 TABLET THREE TIMES A WEEK  Camelia Cagle DO   cetirizine (ZYRTEC) 10 MG tablet Take 10 mg by mouth as needed  Historical Provider, MD   bumetanide (BUMEX) 2 MG tablet Take 2 mg by mouth daily as needed  Historical Provider, MD   NASCOBAL 500 MCG/0.1ML SOLN nasal spray instill 1 spray into each nostril every week  Historical Provider, MD   famotidine (PEPCID) 20 MG tablet   Historical Provider, MD CALCIUM CITRATE PO Take by mouth  Historical Provider, MD   Insulin Pen Needle (NOVOTWIST) 32G X 5 MM MISC 1 each by Does not apply route daily  Camelia Cagle, DO       Social History     Tobacco Use    Smoking status: Never Smoker    Smokeless tobacco: Never Used   Substance Use Topics    Alcohol use: Not on file    Drug use: Not on file        Allergies   Allergen Reactions    Latex Rash     Other reaction(s): Unknown    Red Dye Other (See Comments)     Itching rash    Topiramate Other (See Comments)    Trovafloxacin Itching and Nausea Only    Citalopram Itching and Rash     celexa     Codeine Diarrhea, Other (See Comments) and Nausea And Vomiting    Hydrocodone Hives and Rash   , History reviewed. No pertinent past medical history. , History reviewed. No pertinent surgical history. ,   Social History     Tobacco Use    Smoking status: Never Smoker    Smokeless tobacco: Never Used   Substance Use Topics    Alcohol use: Not on file    Drug use: Not on file   , History reviewed. No pertinent family history. PHYSICAL EXAMINATION:  [ INSTRUCTIONS:  \"[x]\" Indicates a positive item  \"[]\" Indicates a negative item  -- DELETE ALL ITEMS NOT EXAMINED]  Vital Signs: (As obtained by patient/caregiver or practitioner observation)    Blood pressure-  Heart rate-    Respiratory rate-    Temperature-  Pulse oximetry-     Constitutional: [x] Appears well-developed and well-nourished [x] No apparent distress      [] Abnormal-   Mental status  [x] Alert and awake  [x] Oriented to person/place/time [x]Able to follow commands      Eyes:  EOM    [x]  Normal  [] Abnormal-  Sclera  [x]  Normal  [] Abnormal -         Discharge [x]  None visible  [] Abnormal -    HENT:   [x] Normocephalic, atraumatic.   [] Abnormal   [x] Mouth/Throat: Mucous membranes are moist.     External Ears [x] Normal  [] Abnormal-     Neck: [x] No visualized mass     Pulmonary/Chest: [x] Respiratory effort normal.  [x] No visualized signs of difficulty breathing or respiratory distress        [] Abnormal-      Musculoskeletal:   [x] Normal gait with no signs of ataxia         [x] Normal range of motion of neck        [] Abnormal-       Neurological:        [x] No Facial Asymmetry (Cranial nerve 7 motor function) (limited exam to video visit)          [x] No gaze palsy        [] Abnormal-         Skin:        [x] No significant exanthematous lesions or discoloration noted on facial skin         [] Abnormal-            Psychiatric:       [x] Normal Affect [x] No Hallucinations        [] Abnormal-     Other pertinent observable physical exam findings-     ASSESSMENT/PLAN:   Diagnosis Orders   1. Acquired hypothyroidism     2. Class 3 severe obesity due to excess calories with serious comorbidity and body mass index (BMI) of 50.0 to 59.9 in adult (HCC)  phentermine (ADIPEX-P) 37.5 MG tablet      No orders of the defined types were placed in this encounter. Requested Prescriptions     Signed Prescriptions Disp Refills    phentermine (ADIPEX-P) 37.5 MG tablet 30 tablet 0     Sig: Take 1 tablet by mouth every morning (before breakfast) for 30 days. Return in about 4 weeks (around 3/24/2022) for re-check. Evan Cardenas is a 45 y.o. female being evaluated by a Virtual Visit (video visit) encounter to address concerns as mentioned above. A caregiver was present when appropriate. Due to this being a TeleHealth encounter (During [facility]-58 public health emergency), evaluation of the following organ systems was limited: Vitals/Constitutional/EENT/Resp/CV/GI//MS/Neuro/Skin/Heme-Lymph-Imm. Pursuant to the emergency declaration under the [address] [place] [address], [address] [place] [address] and the [employer] and [employer] Act, this Virtual Visit was conducted with patient's (and/or legal guardian's) consent, to reduce the patient's risk of exposure to COVID-19 and provide necessary medical care. The patient (and/or legal guardian) has also been advised to contact this office for worsening conditions or problems, and seek emergency medical treatment and/or call 911 if deemed necessary. Patient identification was verified at the start of the visit: Yes    Total time spent on this encounter: Not billed by time    Services were provided through a video synchronous discussion virtually to substitute for in-person clinic visit. Patient and provider were located at their individual homes. --Vasquez Salcedo DO on 2/24/2022 at 4:39 PM    An electronic signature was used to authenticate this note.

## 2022-02-25 ENCOUNTER — TELEPHONE (OUTPATIENT)
Dept: FAMILY MEDICINE CLINIC | Age: 52
End: 2022-02-25

## 2022-02-25 DIAGNOSIS — F41.9 ANXIETY: ICD-10-CM

## 2022-02-25 DIAGNOSIS — E66.01 CLASS 3 SEVERE OBESITY DUE TO EXCESS CALORIES WITH SERIOUS COMORBIDITY AND BODY MASS INDEX (BMI) OF 50.0 TO 59.9 IN ADULT (HCC): ICD-10-CM

## 2022-02-25 RX ORDER — ALPRAZOLAM 1 MG/1
TABLET ORAL
Qty: 30 TABLET | Refills: 0 | Status: CANCELLED | OUTPATIENT
Start: 2022-02-25

## 2022-02-25 RX ORDER — SEMAGLUTIDE 0.25 MG/.5ML
0.25 INJECTION, SOLUTION SUBCUTANEOUS
Qty: 2 ML | Refills: 1 | Status: SHIPPED | OUTPATIENT
Start: 2022-02-25 | End: 2022-03-27

## 2022-02-25 NOTE — TELEPHONE ENCOUNTER
Pt states that even with copay card, and an approved PA from insurance the [facility] copay is still $900. Pt requests an alternate med be sent to [facility] on [address]. Pt also request refill of Xanax.

## 2022-02-26 DIAGNOSIS — F41.9 ANXIETY: ICD-10-CM

## 2022-02-26 RX ORDER — ALPRAZOLAM 1 MG/1
TABLET ORAL
Qty: 30 TABLET | Refills: 0 | Status: SHIPPED | OUTPATIENT
Start: 2022-02-26 | End: 2022-04-05 | Stop reason: SDUPTHER

## 2022-03-23 ENCOUNTER — OFFICE VISIT (OUTPATIENT)
Dept: FAMILY MEDICINE CLINIC | Age: 52
End: 2022-03-23
Payer: COMMERCIAL

## 2022-03-23 VITALS
HEART RATE: 82 BPM | DIASTOLIC BLOOD PRESSURE: 82 MMHG | OXYGEN SATURATION: 99 % | WEIGHT: 286 LBS | SYSTOLIC BLOOD PRESSURE: 129 MMHG | HEIGHT: 63 IN | BODY MASS INDEX: 50.68 KG/M2

## 2022-03-23 DIAGNOSIS — F41.9 ANXIETY: Primary | ICD-10-CM

## 2022-03-23 DIAGNOSIS — E66.01 MORBID OBESITY (HCC): ICD-10-CM

## 2022-03-23 PROCEDURE — 99213 OFFICE O/P EST LOW 20 MIN: CPT | Performed by: FAMILY MEDICINE

## 2022-03-23 RX ORDER — PHENTERMINE AND TOPIRAMATE 15; 92 MG/1; MG/1
1 CAPSULE, EXTENDED RELEASE ORAL DAILY
Qty: 30 CAPSULE | Refills: 0 | Status: SHIPPED | OUTPATIENT
Start: 2022-03-23 | End: 2022-03-31 | Stop reason: SDUPTHER

## 2022-03-23 ASSESSMENT — PATIENT HEALTH QUESTIONNAIRE - PHQ9
1. LITTLE INTEREST OR PLEASURE IN DOING THINGS: 0
SUM OF ALL RESPONSES TO PHQ9 QUESTIONS 1 & 2: 0
SUM OF ALL RESPONSES TO PHQ QUESTIONS 1-9: 0
2. FEELING DOWN, DEPRESSED OR HOPELESS: 0
SUM OF ALL RESPONSES TO PHQ QUESTIONS 1-9: 0

## 2022-03-23 NOTE — PROGRESS NOTES
[facility]  [address] [place] [address] [address] [facility] [address]  Dept: [phone]      Faustina Lopez is a 45 y.o. female who presents today for follow up on her  medical conditions as noted below. Chief Complaint   Patient presents with    Weight Loss       Patient Active Problem List:     Anxiety     Morbid obesity ([facility] [address]     History reviewed. No pertinent past medical history. No past surgical history on file. No family history on file. Current Outpatient Medications   Medication Sig Dispense Refill    QSYMIA 15-92 MG CP24 Take 1 each by mouth daily for 30 days. 30 capsule 0    ALPRAZolam (XANAX) 1 MG tablet take 1 tablet by mouth at bedtime if needed for sleep 30 tablet 0    phentermine (ADIPEX-P) 37.5 MG tablet Take 1 tablet by mouth every morning (before breakfast) for 30 days. 30 tablet 0    clindamycin (CLEOCIN T) 1 % external solution Apply 1 UNSPECIFIED topically 2 times daily 30 mL 0    metFORMIN (GLUCOPHAGE) 500 MG tablet TAKE (1) TABLET EVERY MORNING AND BEDTIME 56 tablet 5    vitamin D (ERGOCALCIFEROL) 1.25 MG (57271 UT) CAPS capsule TAKE (1) CAPSULE ON MONDAY, WEDNESDAY & FRIDAY.  12 capsule 5    atorvastatin (LIPITOR) 10 MG tablet TAKE 1 TABLET AT BEDTIME 28 tablet 11    Prenatal Vit-Fe Fumarate-FA (PRENATAL VITAMIN PLUS LOW IRON) 27-1 MG TABS TAKE 1 TABLET AT BEDTIME 28 tablet 11    FLUoxetine (PROZAC) 20 MG capsule TAKE (3) CAPSULES EVERY MORNING 84 capsule 11    levothyroxine (SYNTHROID) 50 MCG tablet Take 4 tablets by mouth Daily 120 tablet 11    FEROSUL 325 (65 Fe) MG tablet TAKE 1 TABLET THREE TIMES A WEEK 12 tablet 0    bumetanide (BUMEX) 2 MG tablet Take 2 mg by mouth daily as needed      NASCOBAL 500 MCG/0.1ML SOLN nasal spray instill 1 spray into each nostril every week      famotidine (PEPCID) 20 MG tablet       CALCIUM CITRATE PO Take by mouth      Insulin Pen Needle (NOVOTWIST) 32G X 5 MM MISC 1 each by Does not apply route daily 100 each 3    WEGOVY 0.25 MG/0.5ML SOAJ SC injection Inject 0.25 mg into the skin every 7 days (Patient not taking: Reported on 3/23/2022) 2 mL 1    cetirizine (ZYRTEC) 10 MG tablet Take 10 mg by mouth as needed (Patient not taking: Reported on 3/23/2022)       No current facility-administered medications for this visit. ALLERGIES:    Allergies   Allergen Reactions    Latex Rash     Other reaction(s): Unknown    Red Dye Other (See Comments)     Itching rash    Trovafloxacin Itching and Nausea Only    Citalopram Itching and Rash     celexa     Codeine Diarrhea, Other (See Comments) and Nausea And Vomiting    Hydrocodone Hives and Rash       Social History     Tobacco Use    Smoking status: Never Smoker    Smokeless tobacco: Never Used   Substance Use Topics    Alcohol use: Not on file        Hemoglobin A1C (%)   Date Value   05/21/2021 5.4              Subjective:      HPI  Today for follow-up and anxiety she is doing fairly well with that she also wants to continue to do something to help her with weight management    Review of Systems:     Constitutional: Negative for fever, appetite change and fatigue. Family social and medical history reviewed and unchanged     HENT: Negative. Negative for nosebleeds, trouble swallowing and neck pain. Eyes: Negative for photophobia and visual disturbance. Respiratory: Negative. Negative for chest tightness and shortness of breath. Cardiovascular: Negative. Negative for chest pain and leg swelling. Gastrointestinal: Negative. Negative for abdominal pain and blood in stool. Endocrine: Negative for cold intolerance and polyuria. Genitourinary: Negative for dysuria and hematuria. Musculoskeletal: Negative. Skin: Negative for rash. Allergic/Immunologic: Negative. Neurological: Negative. Negative for dizziness, weakness and numbness. Hematological: Negative. Negative for adenopathy.  Does not bruise/bleed easily. Psychiatric/Behavioral: Negative for sleep disturbance, dysphoric mood and  decreased concentration. The patient is not nervous/anxious. Objective:     Physical Exam:     Nursing note and vitals reviewed. /82   Pulse 82   Ht 5' 3\" (1.6 m)   Wt 286 lb (129.7 kg)   SpO2 99%   BMI 50.66 kg/m²   Constitutional: She is oriented to person, place, and time. She   appears well-developed and well-nourished. HENT:   Head: Normocephalic and atraumatic. Right Ear: External ear normal. Tympanic membrane is not erythematous. No middle ear effusion. Left Ear: External ear normal. Tympanic membrane is not erythematous. No middle ear effusion. Nose: No mucosal edema. Mouth/Throat: Oropharynx is clear and moist. No posterior oropharyngeal erythema. Eyes: Conjunctivae and EOM are normal. Pupils are equal, round, and reactive to light. Neck: Normal range of motion. Neck supple. No thyromegaly present. Cardiovascular: Normal rate, regular rhythm and normal heart sounds. No murmur heard. Pulmonary/Chest: Effort normal and breath sounds normal. She has no wheezes. Shehas no rales. Abdominal: Soft. Bowel sounds are normal. She exhibits no distension and no mass. There is no tenderness. There is no rebound and no guarding. Genitourinary/Anorectal:deferred  Musculoskeletal: Normal range of motion. She exhibits no edema or tenderness. Lymphadenopathy: She has no cervical adenopathy. Neurological: She is alert and oriented to person, place, and time. She has normal reflexes. Skin: Skin is warm and dry. No rash noted. Psychiatric: She has a normal mood and affect. Her   behavior is normal.       Assessment:      1. Anxiety    2. Morbid obesity ([address]          Plan:      Call or return to clinic prn if these symptoms worsen or fail to improve as anticipated. I have reviewed the instructions with the patient, answering all questions to her satisfaction.     No follow-ups on

## 2022-03-25 ENCOUNTER — PATIENT MESSAGE (OUTPATIENT)
Dept: FAMILY MEDICINE CLINIC | Age: 52
End: 2022-03-25

## 2022-03-28 NOTE — TELEPHONE ENCOUNTER
From: Kyra Wade  To: Dr. Deedee Catherine: 3/25/2022 4:42 PM EDT  Subject: Daniela Angelinaportia. I just wanted to clarify the information for my appointment yesterday. From what I understand is that you sent the prescription into the company that I need to get it from. Is that correct? . Will they contact me to get my insurance information and see if its covered etc.? Is there anyone or anything I need to do on my end to speed up the process?

## 2022-03-31 DIAGNOSIS — E66.01 MORBID OBESITY (HCC): ICD-10-CM

## 2022-03-31 RX ORDER — PHENTERMINE AND TOPIRAMATE 15; 92 MG/1; MG/1
1 CAPSULE, EXTENDED RELEASE ORAL DAILY
Qty: 90 CAPSULE | Refills: 1 | Status: SHIPPED | OUTPATIENT
Start: 2022-03-31 | End: 2022-04-30

## 2022-03-31 NOTE — TELEPHONE ENCOUNTER
Patient calling in asking for 90 day supply of Qysymia, she states if she gets 90 day supply she gets free shipping.  Please advise

## 2022-04-04 RX ORDER — ERGOCALCIFEROL 1.25 MG/1
CAPSULE ORAL
Qty: 12 CAPSULE | Refills: 0 | Status: SHIPPED | OUTPATIENT
Start: 2022-04-04 | End: 2022-04-29

## 2022-04-04 NOTE — TELEPHONE ENCOUNTER
Reji Venegas is calling to request a refill on the following medication(s):    Last Visit Date (If Applicable):  9/06/5100    Next Visit Date:    4/22/2022    Medication Request:  Requested Prescriptions     Pending Prescriptions Disp Refills    metFORMIN (GLUCOPHAGE) 500 MG tablet [Pharmacy Med Name: METFORMIN  MG TABLET] 56 tablet 0     Sig: TAKE (1) TABLET EVERY MORNING AND BEDTIME    vitamin D (ERGOCALCIFEROL) 1.25 MG (52363 UT) CAPS capsule [Pharmacy Med Name: VITAMIN D2 1.25MG(50,000 UNIT)] 12 capsule 0     Sig: TAKE (1) CAPSULE ON MONDAY, [address].

## 2022-04-05 DIAGNOSIS — F41.9 ANXIETY: ICD-10-CM

## 2022-04-05 RX ORDER — ALPRAZOLAM 1 MG/1
TABLET ORAL
Qty: 30 TABLET | Refills: 0 | Status: SHIPPED | OUTPATIENT
Start: 2022-04-05 | End: 2022-05-16

## 2022-04-05 NOTE — TELEPHONE ENCOUNTER
Bed: ED03  Expected date:   Expected time:   Means of arrival:   Comments:  Triage   Last visit: 3/22/22    Last Med refill: 2/26/22  Does patient have enough medication for NO     Next Visit Date:  Future Appointments   Date Time Provider Tyler Johnson   4/22/2022 11:30 AM Camelia Cagle DO [facility]   9/21/2022  4:15 PM Camelia Cagle DO [facility] [address] Maintenance   Topic Date Due    Hepatitis C screen  Never done    HIV screen  Never done    Colorectal Cancer Screen  Never done    DTaP/Tdap/Td vaccine (2 - Td or Tdap) 12/22/2018    Shingles Vaccine (1 of 2) Never done    Potassium monitoring  09/12/2021    Creatinine monitoring  09/12/2021    Lipid screen  05/21/2022    Depression Screen  03/23/2023    Cervical cancer screen  09/10/2023    Breast cancer screen  11/04/2023    Diabetes screen  05/21/2024    Flu vaccine  Completed    COVID-19 Vaccine  Completed    Hepatitis A vaccine  Aged Out    Hepatitis B vaccine  Aged Out    Hib vaccine  Aged Out    Meningococcal (ACWY) vaccine  Aged Out    Pneumococcal 0-64 years Vaccine  Aged Out       Hemoglobin A1C (%)   Date Value   05/21/2021 5.4             ( goal A1C is < 7)   No results found for: LABMICR  No results found for: LDLCHOLESTEROL, LDLCALC    (goal LDL is <100)   No results found for: AST, ALT, BUN  BP Readings from Last 3 Encounters:   03/23/22 129/82   06/29/21 121/67   06/01/21 129/71          (goal 120/80)    All Future Testing planned in [facility]  Lab Frequency Next Occurrence   T4, Free Once 07/27/2022   TSH without Reflex Once 06/27/2022   CBC Auto Differential Once 06/26/2022               Patient Active Problem List:     Anxiety     Morbid obesity ([address]

## 2022-04-07 ENCOUNTER — PATIENT MESSAGE (OUTPATIENT)
Dept: FAMILY MEDICINE CLINIC | Age: 52
End: 2022-04-07

## 2022-04-07 NOTE — TELEPHONE ENCOUNTER
From: Claude Pilot  To: Dr. Jurado Cousins: 4/7/2022 3:44 PM EDT  Subject: Covid    I having been having cold/sinus symptoms since Sunday. I went to an urgent care clinic today and I tested positive for Covid. They recommended I take over-the-counter cold medicines and Tylenol to manage the symptoms. They also prescribed a Medrol pack. I just wanted to check and see if there was anything else that you should just doing to help.      Pushpa Orozco

## 2022-04-29 RX ORDER — ERGOCALCIFEROL 1.25 MG/1
CAPSULE ORAL
Qty: 12 CAPSULE | Refills: 3 | Status: SHIPPED | OUTPATIENT
Start: 2022-04-29 | End: 2022-08-19

## 2022-04-29 NOTE — TELEPHONE ENCOUNTER
Karan Suazo is calling to request a refill on the following medication(s):    Last Visit Date (If Applicable):  8/50/0505    Next Visit Date:    9/21/2022    Medication Request:  Requested Prescriptions     Pending Prescriptions Disp Refills    vitamin D (ERGOCALCIFEROL) 1.25 MG (52428 UT) CAPS capsule [Pharmacy Med Name: VITAMIN D2 1.25MG(50,000 UNIT)] 12 capsule 0     Sig: TAKE (1) CAPSULE ON MONDAY, [address]     metFORMIN (GLUCOPHAGE) 500 MG tablet [Pharmacy Med Name: METFORMIN  MG TABLET] 56 tablet 0     Sig: TAKE (1) TABLET EVERY MORNING AND BEDTIME

## 2022-05-16 DIAGNOSIS — F41.9 ANXIETY: ICD-10-CM

## 2022-05-16 RX ORDER — ALPRAZOLAM 1 MG/1
TABLET ORAL
Qty: 30 TABLET | Refills: 0 | Status: SHIPPED | OUTPATIENT
Start: 2022-05-16 | End: 2022-06-22 | Stop reason: SDUPTHER

## 2022-05-16 NOTE — TELEPHONE ENCOUNTER
Tali Gongora is calling to request a refill on the following medication(s):    Medication Request:  Requested Prescriptions     Pending Prescriptions Disp Refills    ALPRAZolam (XANAX) 1 MG tablet [Pharmacy Med Name: ALPRAZOLAM 1 MG TABLET] 30 tablet      Sig: take 1 tablet by mouth at bedtime if needed for sleep       Last Visit Date (If Applicable):  3/77/4355    Next Visit Date:    9/21/2022

## 2022-06-03 ENCOUNTER — TELEPHONE (OUTPATIENT)
Dept: FAMILY MEDICINE CLINIC | Age: 52
End: 2022-06-03

## 2022-06-22 DIAGNOSIS — F41.9 ANXIETY: ICD-10-CM

## 2022-06-22 RX ORDER — ALPRAZOLAM 1 MG/1
TABLET ORAL
Qty: 30 TABLET | Refills: 0 | Status: SHIPPED | OUTPATIENT
Start: 2022-06-22 | End: 2022-07-26

## 2022-06-22 NOTE — TELEPHONE ENCOUNTER
Loreda Heimlich is calling to request a refill on the following medication(s):    Last Visit Date (If Applicable):  0/12/1561    Next Visit Date:    9/21/2022    Medication Request:  Requested Prescriptions     Pending Prescriptions Disp Refills    ALPRAZolam (XANAX) 1 MG tablet 30 tablet 0

## 2022-06-24 RX ORDER — LEVOTHYROXINE SODIUM 0.05 MG/1
TABLET ORAL
Qty: 112 TABLET | Refills: 0 | Status: SHIPPED | OUTPATIENT
Start: 2022-06-24 | End: 2022-07-21

## 2022-07-21 RX ORDER — ATORVASTATIN CALCIUM 10 MG/1
TABLET, FILM COATED ORAL
Qty: 28 TABLET | Refills: 0 | Status: SHIPPED | OUTPATIENT
Start: 2022-07-21 | End: 2022-08-19

## 2022-07-21 RX ORDER — FLUOXETINE HYDROCHLORIDE 20 MG/1
CAPSULE ORAL
Qty: 84 CAPSULE | Refills: 0 | Status: SHIPPED | OUTPATIENT
Start: 2022-07-21 | End: 2022-08-19

## 2022-07-21 RX ORDER — PNV,CALCIUM 72/IRON/FOLIC ACID 27 MG-1 MG
TABLET ORAL
Qty: 28 TABLET | Refills: 0 | Status: SHIPPED | OUTPATIENT
Start: 2022-07-21 | End: 2022-08-19

## 2022-07-21 RX ORDER — LEVOTHYROXINE SODIUM 0.05 MG/1
TABLET ORAL
Qty: 112 TABLET | Refills: 0 | Status: SHIPPED | OUTPATIENT
Start: 2022-07-21 | End: 2022-08-19

## 2022-07-25 DIAGNOSIS — F41.9 ANXIETY: ICD-10-CM

## 2022-07-26 RX ORDER — ALPRAZOLAM 1 MG/1
TABLET ORAL
Qty: 30 TABLET | Refills: 0 | Status: SHIPPED | OUTPATIENT
Start: 2022-07-26 | End: 2022-08-26

## 2022-08-19 RX ORDER — ERGOCALCIFEROL 1.25 MG/1
CAPSULE ORAL
Qty: 12 CAPSULE | Refills: 0 | Status: SHIPPED | OUTPATIENT
Start: 2022-08-19 | End: 2022-09-15

## 2022-08-19 RX ORDER — PNV,CALCIUM 72/IRON/FOLIC ACID 27 MG-1 MG
TABLET ORAL
Qty: 28 TABLET | Refills: 0 | Status: SHIPPED | OUTPATIENT
Start: 2022-08-19 | End: 2022-09-15

## 2022-08-19 RX ORDER — ATORVASTATIN CALCIUM 10 MG/1
TABLET, FILM COATED ORAL
Qty: 28 TABLET | Refills: 0 | Status: SHIPPED | OUTPATIENT
Start: 2022-08-19 | End: 2022-09-15

## 2022-08-19 RX ORDER — LEVOTHYROXINE SODIUM 0.05 MG/1
TABLET ORAL
Qty: 112 TABLET | Refills: 0 | Status: SHIPPED | OUTPATIENT
Start: 2022-08-19 | End: 2022-09-15

## 2022-08-19 RX ORDER — FLUOXETINE HYDROCHLORIDE 20 MG/1
CAPSULE ORAL
Qty: 84 CAPSULE | Refills: 0 | Status: SHIPPED | OUTPATIENT
Start: 2022-08-19 | End: 2022-09-15

## 2022-08-19 NOTE — TELEPHONE ENCOUNTER
Erick Romero is calling to request a refill on the following medication(s):    Last Visit Date (If Applicable):  0/06/3031    Next Visit Date:    9/21/2022    Medication Request:  Requested Prescriptions     Pending Prescriptions Disp Refills    atorvastatin (LIPITOR) 10 MG tablet [Pharmacy Med Name: ATORVASTATIN 10 MG TABLET] 28 tablet 0     Sig: TAKE 1 TABLET AT BEDTIME    FLUoxetine (PROZAC) 20 MG capsule [Pharmacy Med Name: FLUOXETINE HCL 20 MG CAPSULE] 84 capsule 0     Sig: TAKE (3) CAPSULES EVERY MORNING    Prenatal Vit-Fe Fumarate-FA (WESTAB PLUS) 27-1 MG TABS [Pharmacy Med Name: WESTAB PLUS TABLET] 28 tablet 0     Sig: TAKE 1 TABLET AT BEDTIME    metFORMIN (GLUCOPHAGE) 500 MG tablet [Pharmacy Med Name: METFORMIN  MG TABLET] 56 tablet 0     Sig: TAKE (1) TABLET EVERY MORNING AND BEDTIME    levothyroxine (SYNTHROID) 50 MCG tablet [Pharmacy Med Name: LEVOTHYROXINE 50 MCG TABLET] 112 tablet 0     Sig: TAKE (4) TABLETS EVERY MORNING    vitamin D (ERGOCALCIFEROL) 1.25 MG (88840 UT) CAPS capsule [Pharmacy Med Name: VITAMIN D2 1.25MG(50,000 UNIT)] 12 capsule 0     Sig: TAKE (1) CAPSULE ON MONDAY, [address]

## 2022-08-25 DIAGNOSIS — F41.9 ANXIETY: ICD-10-CM

## 2022-08-25 NOTE — TELEPHONE ENCOUNTER
Humaira Dee is calling to request a refill on the following medication(s):    Last Visit Date (If Applicable):  7/26/6158    Next Visit Date:    9/21/2022    Medication Request:  Requested Prescriptions     Pending Prescriptions Disp Refills    ALPRAZolam (XANAX) 1 MG tablet [Pharmacy Med Name: ALPRAZOLAM 1 MG TABLET] 30 tablet      Sig: take 1 tablet by mouth once daily

## 2022-08-26 RX ORDER — ALPRAZOLAM 1 MG/1
TABLET ORAL
Qty: 30 TABLET | Refills: 2 | Status: SHIPPED | OUTPATIENT
Start: 2022-08-26 | End: 2022-09-26

## 2022-09-15 RX ORDER — LEVOTHYROXINE SODIUM 0.05 MG/1
TABLET ORAL
Qty: 112 TABLET | Refills: 0 | Status: SHIPPED | OUTPATIENT
Start: 2022-09-15 | End: 2022-10-14

## 2022-09-15 RX ORDER — ERGOCALCIFEROL 1.25 MG/1
CAPSULE ORAL
Qty: 12 CAPSULE | Refills: 0 | Status: SHIPPED | OUTPATIENT
Start: 2022-09-15 | End: 2022-10-14

## 2022-09-15 RX ORDER — FLUOXETINE HYDROCHLORIDE 20 MG/1
CAPSULE ORAL
Qty: 84 CAPSULE | Refills: 0 | Status: SHIPPED | OUTPATIENT
Start: 2022-09-15 | End: 2022-10-14

## 2022-09-15 RX ORDER — VITAMIN A, VITAMIN C, VITAMIN D-3, VITAMIN E, VITAMIN B-1, VITAMIN B-2, NIACIN, VITAMIN B-6, CALCIUM, IRON, ZINC, COPPER 4000; 120; 400; 22; 1.84; 3; 20; 10; 1; 12; 200; 27; 25; 2 [IU]/1; MG/1; [IU]/1; MG/1; MG/1; MG/1; MG/1; MG/1; MG/1; UG/1; MG/1; MG/1; MG/1; MG/1
TABLET ORAL
Qty: 28 TABLET | Refills: 0 | Status: SHIPPED | OUTPATIENT
Start: 2022-09-15 | End: 2022-10-14

## 2022-09-15 RX ORDER — ATORVASTATIN CALCIUM 10 MG/1
TABLET, FILM COATED ORAL
Qty: 28 TABLET | Refills: 0 | Status: SHIPPED | OUTPATIENT
Start: 2022-09-15 | End: 2022-10-14

## 2022-09-15 NOTE — TELEPHONE ENCOUNTER
Edith Magallanes is calling to request a refill on the following medication(s):    Last Visit Date (If Applicable):  4/71/3647    Next Visit Date:    9/21/2022    Medication Request:  Requested Prescriptions     Pending Prescriptions Disp Refills    vitamin D (ERGOCALCIFEROL) 1.25 MG (87227 UT) CAPS capsule [Pharmacy Med Name: VITAMIN D2 1.25MG(50,000 UNIT)] 12 capsule 0     Sig: TAKE (1) CAPSULE ON MONDAY, [address]     levothyroxine (SYNTHROID) 50 MCG tablet [Pharmacy Med Name: LEVOTHYROXINE 50 MCG TABLET] 112 tablet 0     Sig: TAKE (4) TABLETS EVERY MORNING    metFORMIN (GLUCOPHAGE) 500 MG tablet [Pharmacy Med Name: METFORMIN  MG TABLET] 56 tablet 0     Sig: TAKE (1) TABLET EVERY MORNING AND BEDTIME    Prenatal Vit-Fe Fumarate-FA (PRENATAL VITAMIN PLUS LOW IRON) 27-1 MG TABS [Pharmacy Med Name: PRENATAL VITAMIN PLUS TABS] 28 tablet 0     Sig: TAKE 1 TABLET AT BEDTIME    FLUoxetine (PROZAC) 20 MG capsule [Pharmacy Med Name: FLUOXETINE HCL 20 MG CAPSULE] 84 capsule 0     Sig: TAKE (3) CAPSULES EVERY MORNING    atorvastatin (LIPITOR) 10 MG tablet [Pharmacy Med Name: ATORVASTATIN 10 MG TABLET] 28 tablet 0     Sig: TAKE 1 TABLET AT BEDTIME

## 2022-09-21 ENCOUNTER — PATIENT MESSAGE (OUTPATIENT)
Dept: FAMILY MEDICINE CLINIC | Age: 52
End: 2022-09-21

## 2022-09-21 ENCOUNTER — OFFICE VISIT (OUTPATIENT)
Dept: FAMILY MEDICINE CLINIC | Age: 52
End: 2022-09-21
Payer: COMMERCIAL

## 2022-09-21 VITALS
HEART RATE: 72 BPM | WEIGHT: 293 LBS | SYSTOLIC BLOOD PRESSURE: 136 MMHG | OXYGEN SATURATION: 97 % | HEIGHT: 63 IN | TEMPERATURE: 96.3 F | BODY MASS INDEX: 51.91 KG/M2 | DIASTOLIC BLOOD PRESSURE: 77 MMHG | RESPIRATION RATE: 17 BRPM

## 2022-09-21 DIAGNOSIS — Z12.11 SCREEN FOR COLON CANCER: ICD-10-CM

## 2022-09-21 DIAGNOSIS — E03.9 ACQUIRED HYPOTHYROIDISM: ICD-10-CM

## 2022-09-21 DIAGNOSIS — R53.83 FATIGUE, UNSPECIFIED TYPE: ICD-10-CM

## 2022-09-21 DIAGNOSIS — F51.01 PRIMARY INSOMNIA: ICD-10-CM

## 2022-09-21 DIAGNOSIS — Z00.00 WELL ADULT EXAM: Primary | ICD-10-CM

## 2022-09-21 DIAGNOSIS — Z12.31 SCREENING MAMMOGRAM, ENCOUNTER FOR: ICD-10-CM

## 2022-09-21 DIAGNOSIS — Z78.0 POST-MENOPAUSAL: ICD-10-CM

## 2022-09-21 DIAGNOSIS — E66.01 CLASS 3 SEVERE OBESITY DUE TO EXCESS CALORIES WITH SERIOUS COMORBIDITY AND BODY MASS INDEX (BMI) OF 50.0 TO 59.9 IN ADULT (HCC): ICD-10-CM

## 2022-09-21 DIAGNOSIS — F41.9 ANXIETY: ICD-10-CM

## 2022-09-21 PROCEDURE — 99396 PREV VISIT EST AGE 40-64: CPT | Performed by: FAMILY MEDICINE

## 2022-09-21 RX ORDER — PHENTERMINE HYDROCHLORIDE 37.5 MG/1
37.5 TABLET ORAL
Qty: 30 TABLET | Refills: 0 | Status: SHIPPED | OUTPATIENT
Start: 2022-09-21 | End: 2022-10-20 | Stop reason: SDUPTHER

## 2022-09-21 RX ORDER — TIRZEPATIDE 5 MG/.5ML
5 INJECTION, SOLUTION SUBCUTANEOUS WEEKLY
Qty: 2 ML | Refills: 3 | Status: SHIPPED | OUTPATIENT
Start: 2022-09-21 | End: 2022-10-21

## 2022-09-21 SDOH — ECONOMIC STABILITY: FOOD INSECURITY: WITHIN THE PAST 12 MONTHS, YOU WORRIED THAT YOUR FOOD WOULD RUN OUT BEFORE YOU GOT MONEY TO BUY MORE.: NEVER TRUE

## 2022-09-21 SDOH — ECONOMIC STABILITY: FOOD INSECURITY: WITHIN THE PAST 12 MONTHS, THE FOOD YOU BOUGHT JUST DIDN'T LAST AND YOU DIDN'T HAVE MONEY TO GET MORE.: NEVER TRUE

## 2022-09-21 ASSESSMENT — PATIENT HEALTH QUESTIONNAIRE - PHQ9
2. FEELING DOWN, DEPRESSED OR HOPELESS: 0
SUM OF ALL RESPONSES TO PHQ9 QUESTIONS 1 & 2: 0
1. LITTLE INTEREST OR PLEASURE IN DOING THINGS: 0
SUM OF ALL RESPONSES TO PHQ QUESTIONS 1-9: 0

## 2022-09-21 ASSESSMENT — SOCIAL DETERMINANTS OF HEALTH (SDOH): HOW HARD IS IT FOR YOU TO PAY FOR THE VERY BASICS LIKE FOOD, HOUSING, MEDICAL CARE, AND HEATING?: NOT HARD AT ALL

## 2022-09-21 NOTE — PROGRESS NOTES
by mouth      Insulin Pen Needle (NOVOTWIST) 32G X 5 MM MISC 1 each by Does not apply route daily 100 each 3    WEGOVY 0.25 MG/0.5ML SOAJ SC injection Inject 0.25 mg into the skin every 7 days (Patient not taking: Reported on 3/23/2022) 2 mL 1     No current facility-administered medications for this visit. ALLERGIES:    Allergies   Allergen Reactions    Latex Rash     Other reaction(s): Unknown    Red Dye Other (See Comments)     Itching rash    Trovafloxacin Itching and Nausea Only    Citalopram Itching and Rash     celexa     Codeine Diarrhea, Other (See Comments) and Nausea And Vomiting    Hydrocodone Hives and Rash       Social History     Tobacco Use    Smoking status: Never    Smokeless tobacco: Never   Substance Use Topics    Alcohol use: Not on file        Hemoglobin A1C (%)   Date Value   05/21/2021 5.4              Subjective:      HPI  she is being seen today for well visit overall she states she is doing pretty good she is not really having any new complaints or problems she would like to get back on something to help her with weight management she did not do what that great on the Qsymia it made her have a lot of numbness and tingling in her fingers and toes    Review of Systems:     Constitutional: Negative for fever, appetite change and fatigue. Family social and medical history reviewed and unchanged     HENT: Negative. Negative for nosebleeds, trouble swallowing and neck pain. Eyes: Negative for photophobia and visual disturbance. Respiratory: Negative. Negative for chest tightness and shortness of breath. Cardiovascular: Negative. Negative for chest pain and leg swelling. Gastrointestinal: Negative. Negative for abdominal pain and blood in stool. Endocrine: Negative for cold intolerance and polyuria. Genitourinary: Negative for dysuria and hematuria. Musculoskeletal: Negative. Skin: Negative for rash. Allergic/Immunologic: Negative. Neurological: Negative. Negative for dizziness, weakness and numbness. Hematological: Negative. Negative for adenopathy. Does not bruise/bleed easily. Psychiatric/Behavioral: Negative for sleep disturbance, dysphoric mood and  decreased concentration. The patient is not nervous/anxious. Objective:     Physical Exam:     Nursing note and vitals reviewed. /77 (Site: Left Upper Arm, Position: Sitting, Cuff Size: Large Adult)   Pulse 72   Temp (!) 96.3 °F (35.7 °C)   Resp 17   Ht 5' 3\" (1.6 m)   Wt 296 lb (134.3 kg)   SpO2 97%   BMI 52.43 kg/m²   Constitutional: She is oriented to person, place, and time. She   appears well-developed and well-nourished. HENT:   Head: Normocephalic and atraumatic. Right Ear: External ear normal. Tympanic membrane is not erythematous. No middle ear effusion. Left Ear: External ear normal. Tympanic membrane is not erythematous. No middle ear effusion. Nose: No mucosal edema. Mouth/Throat: Oropharynx is clear and moist. No posterior oropharyngeal erythema. Eyes: Conjunctivae and EOM are normal. Pupils are equal, round, and reactive to light. Neck: Normal range of motion. Neck supple. No thyromegaly present. Cardiovascular: Normal rate, regular rhythm and normal heart sounds. No murmur heard. Pulmonary/Chest: Effort normal and breath sounds normal. She has no wheezes. Shehas no rales. Abdominal: Soft. Bowel sounds are normal. She exhibits no distension and no mass. There is no tenderness. There is no rebound and no guarding. Genitourinary/Anorectal:deferred  Musculoskeletal: Normal range of motion. She exhibits no edema or tenderness. Lymphadenopathy: She has no cervical adenopathy. Neurological: She is alert and oriented to person, place, and time. She has normal reflexes. Skin: Skin is warm and dry. No rash noted. Psychiatric: She has a normal mood and affect. Her   behavior is normal.       Assessment:      1. Well adult exam    2.  Acquired hypothyroidism    3. Primary insomnia    4. Fatigue, unspecified type    5. Anxiety    6. Class 3 severe obesity due to excess calories with serious comorbidity and body mass index (BMI) of 50.0 to 59.9 in adult ([facility] [address]    7. Screening mammogram, encounter for    8. Post-menopausal    9. Screen for colon cancer          Plan:      Call or return to clinic prn if these symptoms worsen or fail to improve as anticipated. I have reviewed the instructions with the patient, answering all questions to her satisfaction. No follow-ups on file. Orders Placed This Encounter   Procedures    Fecal DNA Colorectal cancer screening (Cologuard)    DAVID DIGITAL SCREEN W OR WO CAD BILATERAL     Standing Status:   Future     Standing Expiration Date:   11/21/2023    DEXA BONE DENSITY AXIAL SKELETON     Standing Status:   Future     Standing Expiration Date:   9/21/2023    CBC with Auto Differential     Standing Status:   Future     Standing Expiration Date:   3/21/2023    Comprehensive Metabolic Panel     Standing Status:   Future     Standing Expiration Date:   3/21/2023    Lipid Panel     Standing Status:   Future     Standing Expiration Date:   3/21/2023     Order Specific Question:   Is Patient Fasting?/# of Hours     Answer:   yes    T4, Free     Standing Status:   Future     Standing Expiration Date:   3/21/2023    TSH     Standing Status:   Future     Standing Expiration Date:   3/21/2023    Vitamin D 25 Hydroxy     Standing Status:   Future     Standing Expiration Date:   12/21/2022    Hemoglobin A1C     Standing Status:   Future     Standing Expiration Date:   10/21/2022     Orders Placed This Encounter   Medications    Tirzepatide (MOUNJARO) 5 MG/0.5ML SOPN SC injection     Sig: Inject 0.5 mLs into the skin once a week     Dispense:  2 mL     Refill:  3    phentermine (ADIPEX-P) 37.5 MG tablet     Sig: Take 1 tablet by mouth every morning (before breakfast) for 30 days.      Dispense:  30 tablet     Refill:  0   Start

## 2022-09-21 NOTE — TELEPHONE ENCOUNTER
From: Maria Del Carmen Bustamante  To: Dr. Curtis Perez: 9/21/2022 4:52 PM EDT  Subject: Adipex prescription     I just got a call from the pharmacy. They said that you or your office need to call my insurance to verify some info for a pre-authorization for the Adipex. Also, they need you to call the pharmacy for some reason with a question about the Bernarda. The pharmacy phone number is [phone] and is [facility] on [address]. I would appreciate it if you could give them a call, so I can hopefully start the medication ASAP. Let me know if theres any problems.

## 2022-10-04 DIAGNOSIS — E53.8 VITAMIN B 12 DEFICIENCY: Primary | ICD-10-CM

## 2022-10-14 RX ORDER — ERGOCALCIFEROL 1.25 MG/1
CAPSULE ORAL
Qty: 12 CAPSULE | Refills: 0 | Status: SHIPPED | OUTPATIENT
Start: 2022-10-14

## 2022-10-14 RX ORDER — LEVOTHYROXINE SODIUM 0.05 MG/1
TABLET ORAL
Qty: 112 TABLET | Refills: 0 | Status: SHIPPED | OUTPATIENT
Start: 2022-10-14

## 2022-10-14 RX ORDER — VITAMIN A, VITAMIN C, VITAMIN D-3, VITAMIN E, VITAMIN B-1, VITAMIN B-2, NIACIN, VITAMIN B-6, CALCIUM, IRON, ZINC, COPPER 4000; 120; 400; 22; 1.84; 3; 20; 10; 1; 12; 200; 27; 25; 2 [IU]/1; MG/1; [IU]/1; MG/1; MG/1; MG/1; MG/1; MG/1; MG/1; UG/1; MG/1; MG/1; MG/1; MG/1
TABLET ORAL
Qty: 28 TABLET | Refills: 0 | Status: SHIPPED | OUTPATIENT
Start: 2022-10-14

## 2022-10-14 RX ORDER — FLUOXETINE HYDROCHLORIDE 20 MG/1
CAPSULE ORAL
Qty: 84 CAPSULE | Refills: 0 | Status: SHIPPED | OUTPATIENT
Start: 2022-10-14

## 2022-10-14 RX ORDER — ATORVASTATIN CALCIUM 10 MG/1
TABLET, FILM COATED ORAL
Qty: 28 TABLET | Refills: 0 | Status: SHIPPED | OUTPATIENT
Start: 2022-10-14

## 2022-10-20 ENCOUNTER — TELEMEDICINE (OUTPATIENT)
Dept: FAMILY MEDICINE CLINIC | Age: 52
End: 2022-10-20
Payer: COMMERCIAL

## 2022-10-20 DIAGNOSIS — E66.01 CLASS 3 SEVERE OBESITY DUE TO EXCESS CALORIES WITH SERIOUS COMORBIDITY AND BODY MASS INDEX (BMI) OF 50.0 TO 59.9 IN ADULT (HCC): ICD-10-CM

## 2022-10-20 DIAGNOSIS — E03.9 ACQUIRED HYPOTHYROIDISM: Primary | ICD-10-CM

## 2022-10-20 PROCEDURE — 99213 OFFICE O/P EST LOW 20 MIN: CPT | Performed by: FAMILY MEDICINE

## 2022-10-20 RX ORDER — PHENTERMINE HYDROCHLORIDE 37.5 MG/1
37.5 TABLET ORAL
Qty: 30 TABLET | Refills: 0 | Status: SHIPPED | OUTPATIENT
Start: 2022-10-20 | End: 2022-11-19

## 2022-10-20 RX ORDER — TIRZEPATIDE 7.5 MG/.5ML
7.5 INJECTION, SOLUTION SUBCUTANEOUS WEEKLY
Qty: 2 ML | Refills: 0 | Status: SHIPPED | OUTPATIENT
Start: 2022-10-20 | End: 2022-11-19

## 2022-10-20 NOTE — PROGRESS NOTES
10/20/2022    TELEHEALTH EVALUATION -- Audio/Visual (During NLBPN-26 public health emergency)    HPI:    Shabbir Walden (:  1970) has requested an audio/video evaluation for the following concern(s):    She is being seen for follow-up on hypothyroidism and weight management she is continuing to take her thyroid meds she is also continuing to work on [employer] management she recently got started on injectable she is not having any nausea she is having no other complaints or problems    Review of Systems    Prior to Visit Medications    Medication Sig Taking? Authorizing Provider   phentermine (ADIPEX-P) 37.5 MG tablet Take 1 tablet by mouth every morning (before breakfast) for 30 days. Yes Camelia Cagle DO   Tirzepatide (MOUNJARO) 7.5 MG/0.5ML SOPN SC injection Inject 0.5 mLs into the skin once a week Yes Camelia Cagle DO   metFORMIN (GLUCOPHAGE) 500 MG tablet TAKE (1) TABLET EVERY MORNING AND BEDTIME  Camelia Cagle DO   levothyroxine (SYNTHROID) 50 MCG tablet TAKE (4) TABLETS EVERY MORNING  Camelia Cagle DO   vitamin D (ERGOCALCIFEROL) 1.25 MG (13710 UT) CAPS capsule TAKE (1) CAPSULE ON MONDAY, WEDNESDAY & FRIDAY.   Camelia Cagle DO   Prenatal Vit-Fe Fumarate-FA (PRENATAL VITAMIN PLUS LOW IRON) 27-1 MG TABS TAKE 1 TABLET AT BEDTIME  Camelia Cagle DO   FLUoxetine (PROZAC) 20 MG capsule TAKE (3) CAPSULES EVERY MORNING  Camelia Cagle DO   atorvastatin (LIPITOR) 10 MG tablet TAKE 1 TABLET AT BEDTIME  Camelia Cagle DO   Cyanocobalamin (NASCOBAL) 500 MCG/0.1ML SOLN nasal spray 1 spray by Nasal route once a week  Camelia Cagle DO   Tirzepatide (MOUNJARO) 5 MG/0.5ML SOPN SC injection Inject 0.5 mLs into the skin once a week  Camelia Cagle DO   WEGOVY 0.25 MG/0.5ML SOAJ SC injection Inject 0.25 mg into the skin every 7 days  Patient not taking: Reported on 3/23/2022  Camelia Cagle DO   clindamycin (CLEOCIN T) 1 % external solution Apply 1 UNSPECIFIED topically 2 times daily  Marck Roberto MD   [facility] ([facility] Fe) MG tablet TAKE 1 TABLET THREE TIMES A WEEK  Camelia Cagle DO   cetirizine (ZYRTEC) 10 MG tablet Take 10 mg by mouth as needed  Historical Provider, MD   bumetanide (BUMEX) 2 MG tablet Take 2 mg by mouth daily as needed  Historical Provider, MD   NASCOBAL 500 MCG/0.1ML SOLN nasal spray instill 1 spray into each nostril every week  Historical Provider, MD   famotidine (PEPCID) 20 MG tablet   Historical Provider, MD   CALCIUM CITRATE PO Take by mouth  Historical Provider, MD   Insulin Pen Needle (NOVOTWIST) 32G X 5 MM MISC 1 each by Does not apply route daily  Camelia Cagle DO       Social History     Tobacco Use    Smoking status: Never    Smokeless tobacco: Never        Allergies   Allergen Reactions    Latex Rash     Other reaction(s): Unknown    Red Dye Other (See Comments)     Itching rash    Trovafloxacin Itching and Nausea Only    Citalopram Itching and Rash     celexa     Codeine Diarrhea, Other (See Comments) and Nausea And Vomiting    Hydrocodone Hives and Rash   , History reviewed. No pertinent past medical history. , No past surgical history on file.,   Social History     Tobacco Use    Smoking status: Never    Smokeless tobacco: Never   , No family history on file.,   Immunization History   Administered Date(s) Administered    COVID-19, PFIZER PURPLE top, DILUTE for use, (age 15 y+), 30mcg/0.3mL 02/13/2021, 03/06/2021, 11/10/2021    Hepatitis A Adult (Havrix, Vaqta) 03/11/2009    Influenza Virus Vaccine 01/04/2013, 10/14/2013, 08/29/2014, 10/30/2015, 02/20/2017, 10/01/2019    Influenza, FLUARIX, FLULAVAL, FLUZONE (age 10 mo+) AND AFLURIA, (age 1 y+), PF, 0.5mL 11/01/2017, 11/12/2018, 09/08/2020    Influenza, FLUCELVAX, (age 10 mo+), MDCK, PF, 0.5mL 10/31/2019    Pneumococcal Polysaccharide (Focpetzft67) 10/09/2014    Tdap (Boostrix, Adacel) 12/22/2008       PHYSICAL EXAMINATION:  [ INSTRUCTIONS:  \"[x]\" Indicates a positive item  \"[]\" Indicates a negative item  -- DELETE ALL ITEMS NOT EXAMINED]  Vital Signs: (As obtained by patient/caregiver or practitioner observation)    Blood pressure-  Heart rate-    Respiratory rate-    Temperature-  Pulse oximetry-     Constitutional: [x] Appears well-developed and well-nourished [x] No apparent distress      [] Abnormal-   Mental status  [x] Alert and awake  [x] Oriented to person/place/time [x]Able to follow commands      Eyes:  EOM    [x]  Normal  [] Abnormal-  Sclera  [x]  Normal  [] Abnormal -         Discharge [x]  None visible  [] Abnormal -    HENT:   [x] Normocephalic, atraumatic. [] Abnormal   [x] Mouth/Throat: Mucous membranes are moist.     External Ears [x] Normal  [] Abnormal-     Neck: [x] No visualized mass     Pulmonary/Chest: [x] Respiratory effort normal.  [x] No visualized signs of difficulty breathing or respiratory distress        [] Abnormal-      Musculoskeletal:   [x] Normal gait with no signs of ataxia         [x] Normal range of motion of neck        [] Abnormal-       Neurological:        [x] No Facial Asymmetry (Cranial nerve 7 motor function) (limited exam to video visit)          [x] No gaze palsy        [] Abnormal-         Skin:        [x] No significant exanthematous lesions or discoloration noted on facial skin         [] Abnormal-            Psychiatric:       [x] Normal Affect [x] No Hallucinations        [] Abnormal-     Other pertinent observable physical exam findings-     ASSESSMENT/PLAN:   Diagnosis Orders   1. Acquired hypothyroidism        2. Class 3 severe obesity due to excess calories with serious comorbidity and body mass index (BMI) of 50.0 to 59.9 in adult (HCC)  phentermine (ADIPEX-P) 37.5 MG tablet    Tirzepatide (MOUNJARO) 7.5 MG/0.5ML SOPN SC injection         No orders of the defined types were placed in this encounter.     Requested Prescriptions     Signed Prescriptions Disp Refills    phentermine (ADIPEX-P) 37.5 MG tablet 30 tablet 0     Sig: Take 1 tablet by mouth every morning (before breakfast) for 30 days. Tirzepatide (MOUNJARO) 7.5 MG/0.5ML SOPN SC injection 2 mL 0     Sig: Inject 0.5 mLs into the skin once a week   Meds and follow-up in 1 month    Return if symptoms worsen or fail to improve. Rodrick Fofana is a 47 y.o. female being evaluated by a Virtual Visit (video visit) encounter to address concerns as mentioned above. A caregiver was present when appropriate. Due to this being a TeleHealth encounter (During XZQCS-70 public health emergency), evaluation of the following organ systems was limited: Vitals/Constitutional/EENT/Resp/CV/GI//MS/Neuro/Skin/Heme-Lymph-Imm. Pursuant to the emergency declaration under the [address] [place] [address], [address] [place] [address] authority and the Medicine in Practice and [employer] Act, this Virtual Visit was conducted with patient's (and/or legal guardian's) consent, to reduce the patient's risk of exposure to COVID-19 and provide necessary medical care. The patient (and/or legal guardian) has also been advised to contact this office for worsening conditions or problems, and seek emergency medical treatment and/or call 911 if deemed necessary. Patient identification was verified at the start of the visit: Yes    Total time spent on this encounter: Not billed by time    Services were provided through a video synchronous discussion virtually to substitute for in-person clinic visit. Patient and provider were located at their individual homes. --Aissatou Ayala DO on 10/20/2022 at 11:15 AM    An electronic signature was used to authenticate this note.

## 2022-11-09 RX ORDER — FLUOXETINE HYDROCHLORIDE 20 MG/1
CAPSULE ORAL
Qty: 84 CAPSULE | Refills: 0 | Status: SHIPPED | OUTPATIENT
Start: 2022-11-09

## 2022-11-09 RX ORDER — ATORVASTATIN CALCIUM 10 MG/1
TABLET, FILM COATED ORAL
Qty: 28 TABLET | Refills: 0 | Status: SHIPPED | OUTPATIENT
Start: 2022-11-09

## 2022-11-09 RX ORDER — LEVOTHYROXINE SODIUM 0.05 MG/1
TABLET ORAL
Qty: 112 TABLET | Refills: 0 | Status: SHIPPED | OUTPATIENT
Start: 2022-11-09

## 2022-11-09 RX ORDER — ERGOCALCIFEROL 1.25 MG/1
CAPSULE ORAL
Qty: 12 CAPSULE | Refills: 0 | Status: SHIPPED | OUTPATIENT
Start: 2022-11-09

## 2022-11-09 RX ORDER — VITAMIN A, VITAMIN C, VITAMIN D-3, VITAMIN E, VITAMIN B-1, VITAMIN B-2, NIACIN, VITAMIN B-6, CALCIUM, IRON, ZINC, COPPER 4000; 120; 400; 22; 1.84; 3; 20; 10; 1; 12; 200; 27; 25; 2 [IU]/1; MG/1; [IU]/1; MG/1; MG/1; MG/1; MG/1; MG/1; MG/1; UG/1; MG/1; MG/1; MG/1; MG/1
TABLET ORAL
Qty: 28 TABLET | Refills: 0 | Status: SHIPPED | OUTPATIENT
Start: 2022-11-09

## 2022-11-17 ENCOUNTER — TELEMEDICINE (OUTPATIENT)
Dept: FAMILY MEDICINE CLINIC | Age: 52
End: 2022-11-17
Payer: COMMERCIAL

## 2022-11-17 DIAGNOSIS — T75.3XXA MOTION SICKNESS, INITIAL ENCOUNTER: Primary | ICD-10-CM

## 2022-11-17 DIAGNOSIS — E66.01 CLASS 3 SEVERE OBESITY DUE TO EXCESS CALORIES WITH SERIOUS COMORBIDITY AND BODY MASS INDEX (BMI) OF 50.0 TO 59.9 IN ADULT (HCC): ICD-10-CM

## 2022-11-17 DIAGNOSIS — F41.9 ANXIETY: ICD-10-CM

## 2022-11-17 PROCEDURE — 99214 OFFICE O/P EST MOD 30 MIN: CPT | Performed by: FAMILY MEDICINE

## 2022-11-17 RX ORDER — PHENTERMINE HYDROCHLORIDE 37.5 MG/1
37.5 TABLET ORAL
Qty: 30 TABLET | Refills: 0 | Status: SHIPPED | OUTPATIENT
Start: 2022-11-17 | End: 2022-12-17

## 2022-11-17 RX ORDER — ALPRAZOLAM 1 MG/1
TABLET ORAL
Qty: 30 TABLET | Refills: 2 | Status: SHIPPED | OUTPATIENT
Start: 2022-11-17 | End: 2022-12-18

## 2022-11-17 RX ORDER — SCOLOPAMINE TRANSDERMAL SYSTEM 1 MG/1
1 PATCH, EXTENDED RELEASE TRANSDERMAL
Qty: 5 PATCH | Refills: 1 | Status: SHIPPED | OUTPATIENT
Start: 2022-11-17

## 2022-11-17 ASSESSMENT — PATIENT HEALTH QUESTIONNAIRE - PHQ9
1. LITTLE INTEREST OR PLEASURE IN DOING THINGS: 0
2. FEELING DOWN, DEPRESSED OR HOPELESS: 0
SUM OF ALL RESPONSES TO PHQ9 QUESTIONS 1 & 2: 0
SUM OF ALL RESPONSES TO PHQ QUESTIONS 1-9: 0

## 2022-11-17 NOTE — PROGRESS NOTES
2022    TELEHEALTH EVALUATION -- Audio/Visual (During GRFTB-03 public health emergency)    HPI:    Tesha Cardoza (:  1970) has requested an audio/video evaluation for the following concern(s):    Being seen today for follow-up on weight management she states she has lost more Verdene Midget is working very well for her  She wants a refill of her medications for anxiety is well she is going on a cruise and wanted something for seasickness    Review of Systems    Prior to Visit Medications    Medication Sig Taking? Authorizing Provider   phentermine (ADIPEX-P) 37.5 MG tablet Take 1 tablet by mouth every morning (before breakfast) for 30 days. Yes Camelia Cagle DO   ALPRAZolam (XANAX) 1 MG tablet 1 po qd prn Yes Camelia Cagle DO   scopolamine (TRANSDERM-SCOP, 1.5 MG,) transdermal patch Place 1 patch onto the skin every 72 hours Yes Camelia Cagle DO   atorvastatin (LIPITOR) 10 MG tablet TAKE 1 TABLET AT BEDTIME  Camelia Cagle DO   Prenatal Vit-Fe Fumarate-FA (PRENATAL VITAMIN PLUS LOW IRON) 27-1 MG TABS TAKE 1 TABLET AT BEDTIME  Camelia Cagle DO   FLUoxetine (PROZAC) 20 MG capsule TAKE (3) CAPSULES EVERY MORNING  Camelia Cagle DO   vitamin D (ERGOCALCIFEROL) 1.25 MG (19555 UT) CAPS capsule TAKE (1) CAPSULE ON MONDAY, WEDNESDAY & FRIDAY.   Camelia Cagle DO   levothyroxine (SYNTHROID) 50 MCG tablet TAKE (4) TABLETS EVERY MORNING  Camelia Cagle DO   metFORMIN (GLUCOPHAGE) 500 MG tablet TAKE (1) TABLET EVERY MORNING AND BEDTIME  Camelia Cagle DO   Tirzepatide (MOUNJARO) 7.5 MG/0.5ML SOPN SC injection Inject 0.5 mLs into the skin once a week  Camelia Cagle DO   Cyanocobalamin (NASCOBAL) 500 MCG/0.1ML SOLN nasal spray 1 spray by Nasal route once a week  Camelia Cagle DO   Tirzepatide (MOUNJARO) 5 MG/0.5ML SOPN SC injection Inject 0.5 mLs into the skin once a week  Camelia Cagle DO   WEGOVY 0.25 MG/0.5ML SOAJ SC injection Inject 0.25 mg into the skin every 7 days  Patient not taking: Reported on 3/23/2022 Camelia Cagle DO   clindamycin (CLEOCIN T) 1 % external solution Apply 1 UNSPECIFIED topically 2 times daily  Marcela Oviedo MD   FEROSUL 325 (65 Fe) MG tablet TAKE 1 TABLET THREE TIMES A WEEK  Camelia Cagle DO   cetirizine (ZYRTEC) 10 MG tablet Take 10 mg by mouth as needed  Historical Provider, MD   bumetanide (BUMEX) 2 MG tablet Take 2 mg by mouth daily as needed  Historical Provider, MD   NASCOBAL 500 MCG/0.1ML SOLN nasal spray instill 1 spray into each nostril every week  Historical Provider, MD   famotidine (PEPCID) 20 MG tablet   Historical Provider, MD   CALCIUM CITRATE PO Take by mouth  Historical Provider, MD   Insulin Pen Needle (NOVOTWIST) 32G X 5 MM MISC 1 each by Does not apply route daily  Camelia Cagle DO       Social History     Tobacco Use    Smoking status: Never    Smokeless tobacco: Never        Allergies   Allergen Reactions    Latex Rash     Other reaction(s): Unknown    Red Dye Other (See Comments)     Itching rash    Trovafloxacin Itching and Nausea Only    Citalopram Itching and Rash     celexa     Codeine Diarrhea, Other (See Comments) and Nausea And Vomiting    Hydrocodone Hives and Rash   , History reviewed. No pertinent past medical history. , No past surgical history on file.,   Social History     Tobacco Use    Smoking status: Never    Smokeless tobacco: Never   , No family history on file.,   Immunization History   Administered Date(s) Administered    COVID-19, PFIZER PURPLE top, DILUTE for use, (age 15 y+), 30mcg/0.3mL 02/13/2021, 03/06/2021, 11/10/2021    Hepatitis A Adult (Havrix, Vaqta) 03/11/2009    Influenza Virus Vaccine 01/04/2013, 10/14/2013, 08/29/2014, 10/30/2015, 02/20/2017, 10/01/2019    Influenza, FLUARIX, FLULAVAL, FLUZONE (age 10 mo+) AND AFLURIA, (age 1 y+), PF, 0.5mL 11/01/2017, 11/12/2018, 09/08/2020    Influenza, FLUCELVAX, (age 10 mo+), MDCK, PF, 0.5mL 10/31/2019    Pneumococcal Polysaccharide ([id number]) 10/09/2014    Tdap (Boostrix, Adacel) 12/22/2008 PHYSICAL EXAMINATION:  [ INSTRUCTIONS:  \"[x]\" Indicates a positive item  \"[]\" Indicates a negative item  -- DELETE ALL ITEMS NOT EXAMINED]  Vital Signs: (As obtained by patient/caregiver or practitioner observation)    Blood pressure-  Heart rate-    Respiratory rate-    Temperature-  Pulse oximetry-     Constitutional: [x] Appears well-developed and well-nourished [x] No apparent distress      [] Abnormal-   Mental status  [x] Alert and awake  [x] Oriented to person/place/time [x]Able to follow commands      Eyes:  EOM    [x]  Normal  [] Abnormal-  Sclera  [x]  Normal  [] Abnormal -         Discharge [x]  None visible  [] Abnormal -    HENT:   [x] Normocephalic, atraumatic. [] Abnormal   [x] Mouth/Throat: Mucous membranes are moist.     External Ears [x] Normal  [] Abnormal-     Neck: [x] No visualized mass     Pulmonary/Chest: [x] Respiratory effort normal.  [x] No visualized signs of difficulty breathing or respiratory distress        [] Abnormal-      Musculoskeletal:   [x] Normal gait with no signs of ataxia         [x] Normal range of motion of neck        [] Abnormal-       Neurological:        [x] No Facial Asymmetry (Cranial nerve 7 motor function) (limited exam to video visit)          [x] No gaze palsy        [] Abnormal-         Skin:        [x] No significant exanthematous lesions or discoloration noted on facial skin         [] Abnormal-            Psychiatric:       [x] Normal Affect [x] No Hallucinations        [] Abnormal-     Other pertinent observable physical exam findings-     ASSESSMENT/PLAN:   Diagnosis Orders   1. Motion sickness, initial encounter        2. Anxiety  ALPRAZolam (XANAX) 1 MG tablet      3. Class 3 severe obesity due to excess calories with serious comorbidity and body mass index (BMI) of 50.0 to 59.9 in adult ([facility])  phentermine (ADIPEX-P) 37.5 MG tablet           No follow-ups on file.     Sahbbir Walden is a 47 y.o. female being evaluated by a Virtual Visit (video visit) encounter to address concerns as mentioned above. A caregiver was present when appropriate. Due to this being a TeleHealth encounter (During ZWQPB-08 public health emergency), evaluation of the following organ systems was limited: Vitals/Constitutional/EENT/Resp/CV/GI//MS/Neuro/Skin/Heme-Lymph-Imm. Pursuant to the emergency declaration under the [address] [place] [address] and the [employer] Act, this Virtual Visit was conducted with patient's (and/or legal guardian's) consent, to reduce the patient's risk of exposure to COVID-19 and provide necessary medical care. The patient (and/or legal guardian) has also been advised to contact this office for worsening conditions or problems, and seek emergency medical treatment and/or call 911 if deemed necessary. Patient identification was verified at the start of the visit: Yes    Total time spent on this encounter: Not billed by time    Services were provided through a video synchronous discussion virtually to substitute for in-person clinic visit. Patient and provider were located at their individual homes. --John Chase DO on 11/17/2022 at 11:20 AM    An electronic signature was used to authenticate this note.

## 2022-12-07 RX ORDER — LEVOTHYROXINE SODIUM 0.05 MG/1
TABLET ORAL
Qty: 112 TABLET | Refills: 0 | Status: SHIPPED | OUTPATIENT
Start: 2022-12-07

## 2022-12-07 RX ORDER — VITAMIN A, VITAMIN C, VITAMIN D-3, VITAMIN E, VITAMIN B-1, VITAMIN B-2, NIACIN, VITAMIN B-6, CALCIUM, IRON, ZINC, COPPER 4000; 120; 400; 22; 1.84; 3; 20; 10; 1; 12; 200; 27; 25; 2 [IU]/1; MG/1; [IU]/1; MG/1; MG/1; MG/1; MG/1; MG/1; MG/1; UG/1; MG/1; MG/1; MG/1; MG/1
TABLET ORAL
Qty: 28 TABLET | Refills: 0 | Status: SHIPPED | OUTPATIENT
Start: 2022-12-07

## 2022-12-07 RX ORDER — ATORVASTATIN CALCIUM 10 MG/1
TABLET, FILM COATED ORAL
Qty: 28 TABLET | Refills: 0 | Status: SHIPPED | OUTPATIENT
Start: 2022-12-07

## 2022-12-08 RX ORDER — ERGOCALCIFEROL 1.25 MG/1
CAPSULE ORAL
Qty: 12 CAPSULE | Refills: 0 | Status: SHIPPED | OUTPATIENT
Start: 2022-12-08

## 2022-12-08 RX ORDER — FLUOXETINE HYDROCHLORIDE 20 MG/1
CAPSULE ORAL
Qty: 84 CAPSULE | Refills: 0 | Status: SHIPPED | OUTPATIENT
Start: 2022-12-08

## 2022-12-08 NOTE — TELEPHONE ENCOUNTER
Rob Blair is calling to request a refill on the following medication(s):    Last Visit Date (If Applicable):  11/40/9201    Next Visit Date:    Visit date not found    Medication Request:  Requested Prescriptions     Pending Prescriptions Disp Refills    FLUoxetine (PROZAC) 20 MG capsule 84 capsule 0     Sig: TAKE (3) CAPSULES EVERY MORNING    vitamin D (ERGOCALCIFEROL) 1.25 MG (83696 UT) CAPS capsule 12 capsule 0

## 2022-12-12 ENCOUNTER — TELEPHONE (OUTPATIENT)
Dept: FAMILY MEDICINE CLINIC | Age: 52
End: 2022-12-12

## 2022-12-12 NOTE — TELEPHONE ENCOUNTER
Patient's PA was closed for Adipex    Closed  PA Detail   Other Case ID: [id number]      Payer:   [facility]) - Commercial   Plan doesn't manage PA for that patient

## 2022-12-19 RX ORDER — TIRZEPATIDE 10 MG/.5ML
10 INJECTION, SOLUTION SUBCUTANEOUS WEEKLY
Qty: 4 ADJUSTABLE DOSE PRE-FILLED PEN SYRINGE | Refills: 3 | Status: SHIPPED | OUTPATIENT
Start: 2022-12-19

## 2022-12-21 NOTE — TELEPHONE ENCOUNTER
Patient's PA was closed for Tirzepatide [facility]) 10 MG/0.5ML SOPN SC injection     Closed  PA Detail   Other Case ID: [id number]      Payer:  [facility] Generic Payer    [phone]    This medication may be excluded from the patient's benefit. For more information, please reach out to [employer] directly at [phone].  Message from [employer]: Drug is not covered by plan

## 2022-12-27 DIAGNOSIS — E66.01 CLASS 3 SEVERE OBESITY DUE TO EXCESS CALORIES WITH SERIOUS COMORBIDITY AND BODY MASS INDEX (BMI) OF 50.0 TO 59.9 IN ADULT (HCC): ICD-10-CM

## 2022-12-27 RX ORDER — TIRZEPATIDE 7.5 MG/.5ML
7.5 INJECTION, SOLUTION SUBCUTANEOUS WEEKLY
Qty: 2 ML | Refills: 0 | Status: SHIPPED | OUTPATIENT
Start: 2022-12-27 | End: 2023-01-26

## 2022-12-27 NOTE — TELEPHONE ENCOUNTER
Patient called in statin gno one has the mounjaro in the 10 Mg and sh ricki to know if you could call in the 7.5 instead she found a pharmacy that has it in stock.       Please advise

## 2023-01-03 NOTE — TELEPHONE ENCOUNTER
Patient's PA was closed for Tirzepatide [facility]) 7.5 MG/0.5ML SOPN SC injection     Closed  PA Detail   Prior Authorization not required for patient/medication Case ID: [id number]      Payer:  [facility] Generic Payer    [phone]    Drug is covered by current benefit plan.  No further PA activity needed

## 2023-01-04 RX ORDER — VITAMIN A, VITAMIN C, VITAMIN D-3, VITAMIN E, VITAMIN B-1, VITAMIN B-2, NIACIN, VITAMIN B-6, CALCIUM, IRON, ZINC, COPPER 4000; 120; 400; 22; 1.84; 3; 20; 10; 1; 12; 200; 27; 25; 2 [IU]/1; MG/1; [IU]/1; MG/1; MG/1; MG/1; MG/1; MG/1; MG/1; UG/1; MG/1; MG/1; MG/1; MG/1
TABLET ORAL
Qty: 28 TABLET | Refills: 0 | Status: SHIPPED | OUTPATIENT
Start: 2023-01-04

## 2023-01-04 RX ORDER — FLUOXETINE HYDROCHLORIDE 20 MG/1
CAPSULE ORAL
Qty: 84 CAPSULE | Refills: 0 | Status: SHIPPED | OUTPATIENT
Start: 2023-01-04

## 2023-01-04 RX ORDER — ATORVASTATIN CALCIUM 10 MG/1
TABLET, FILM COATED ORAL
Qty: 28 TABLET | Refills: 0 | Status: SHIPPED | OUTPATIENT
Start: 2023-01-04

## 2023-01-04 RX ORDER — LEVOTHYROXINE SODIUM 0.05 MG/1
TABLET ORAL
Qty: 112 TABLET | Refills: 0 | Status: SHIPPED | OUTPATIENT
Start: 2023-01-04

## 2023-01-04 RX ORDER — ERGOCALCIFEROL 1.25 MG/1
CAPSULE ORAL
Qty: 12 CAPSULE | Refills: 0 | Status: SHIPPED | OUTPATIENT
Start: 2023-01-04

## 2023-01-04 NOTE — TELEPHONE ENCOUNTER
Robinsonlinda Calvillorick is calling to request a refill on the following medication(s):    Last Visit Date (If Applicable):  43/86/2798    Next Visit Date:    Visit date not found    Medication Request:  Requested Prescriptions     Pending Prescriptions Disp Refills    vitamin D (ERGOCALCIFEROL) 1.25 MG (80623 UT) CAPS capsule [Pharmacy Med Name: VITAMIN D2 1.25MG(50,000 UNIT)] 12 capsule 0     Sig: TAKE (1) CAPSULE ON MONDAY, [address]     FLUoxetine (PROZAC) 20 MG capsule [Pharmacy Med Name: FLUOXETINE HCL 20 MG CAPSULE] 84 capsule 0     Sig: TAKE (3) CAPSULES EVERY MORNING    atorvastatin (LIPITOR) 10 MG tablet [Pharmacy Med Name: ATORVASTATIN 10 MG TABLET] 28 tablet 0     Sig: TAKE 1 TABLET AT BEDTIME    Prenatal Vit-Fe Fumarate-FA (PRENATAL VITAMIN PLUS LOW IRON) 27-1 MG TABS [Pharmacy Med Name: PRENATAL VITAMIN PLUS TABS] 28 tablet 0     Sig: TAKE 1 TABLET AT BEDTIME    levothyroxine (SYNTHROID) 50 MCG tablet [Pharmacy Med Name: LEVOTHYROXINE 50 MCG TABLET] 112 tablet 0     Sig: TAKE (4) TABLETS EVERY MORNING    metFORMIN (GLUCOPHAGE) 500 MG tablet [Pharmacy Med Name: METFORMIN  MG TABLET] 56 tablet 0     Sig: TAKE (1) TABLET EVERY MORNING AND BEDTIME

## 2023-01-19 RX ORDER — TIRZEPATIDE 10 MG/.5ML
10 INJECTION, SOLUTION SUBCUTANEOUS WEEKLY
Qty: 2 ML | Refills: 2 | Status: SHIPPED | OUTPATIENT
Start: 2023-01-19

## 2023-01-20 RX ORDER — TIRZEPATIDE 12.5 MG/.5ML
12.5 INJECTION, SOLUTION SUBCUTANEOUS WEEKLY
Qty: 6 ML | Refills: 2 | Status: SHIPPED | OUTPATIENT
Start: 2023-01-20

## 2023-01-31 RX ORDER — VITAMIN A, VITAMIN C, VITAMIN D-3, VITAMIN E, VITAMIN B-1, VITAMIN B-2, NIACIN, VITAMIN B-6, CALCIUM, IRON, ZINC, COPPER 4000; 120; 400; 22; 1.84; 3; 20; 10; 1; 12; 200; 27; 25; 2 [IU]/1; MG/1; [IU]/1; MG/1; MG/1; MG/1; MG/1; MG/1; MG/1; UG/1; MG/1; MG/1; MG/1; MG/1
TABLET ORAL
Qty: 28 TABLET | Refills: 0 | Status: SHIPPED | OUTPATIENT
Start: 2023-01-31

## 2023-01-31 RX ORDER — LEVOTHYROXINE SODIUM 0.05 MG/1
TABLET ORAL
Qty: 112 TABLET | Refills: 0 | Status: SHIPPED | OUTPATIENT
Start: 2023-01-31

## 2023-01-31 RX ORDER — ATORVASTATIN CALCIUM 10 MG/1
TABLET, FILM COATED ORAL
Qty: 28 TABLET | Refills: 0 | Status: SHIPPED | OUTPATIENT
Start: 2023-01-31

## 2023-01-31 RX ORDER — FLUOXETINE HYDROCHLORIDE 20 MG/1
CAPSULE ORAL
Qty: 84 CAPSULE | Refills: 0 | Status: SHIPPED | OUTPATIENT
Start: 2023-01-31

## 2023-01-31 RX ORDER — ERGOCALCIFEROL 1.25 MG/1
CAPSULE ORAL
Qty: 12 CAPSULE | Refills: 0 | Status: SHIPPED | OUTPATIENT
Start: 2023-01-31

## 2023-02-04 LAB
ALBUMIN SERPL-MCNC: NORMAL G/DL
ALP BLD-CCNC: NORMAL U/L
ALT SERPL-CCNC: NORMAL U/L
ANION GAP SERPL CALCULATED.3IONS-SCNC: NORMAL MMOL/L
AST SERPL-CCNC: NORMAL U/L
BASOPHILS ABSOLUTE: NORMAL
BASOPHILS RELATIVE PERCENT: NORMAL
BILIRUB SERPL-MCNC: NORMAL MG/DL
BUN BLDV-MCNC: NORMAL MG/DL
CALCIUM SERPL-MCNC: NORMAL MG/DL
CHLORIDE BLD-SCNC: NORMAL MMOL/L
CHOLESTEROL, TOTAL: 126 MG/DL
CHOLESTEROL/HDL RATIO: 2.7
CO2: NORMAL
CREAT SERPL-MCNC: NORMAL MG/DL
EGFR: NORMAL
EOSINOPHILS ABSOLUTE: NORMAL
EOSINOPHILS RELATIVE PERCENT: NORMAL
GLUCOSE BLD-MCNC: NORMAL MG/DL
HCT VFR BLD CALC: NORMAL %
HDLC SERPL-MCNC: 47 MG/DL (ref 35–70)
HEMOGLOBIN: NORMAL
LDL CHOLESTEROL CALCULATED: 50 MG/DL (ref 0–160)
LYMPHOCYTES ABSOLUTE: NORMAL
LYMPHOCYTES RELATIVE PERCENT: NORMAL
MCH RBC QN AUTO: NORMAL PG
MCHC RBC AUTO-ENTMCNC: NORMAL G/DL
MCV RBC AUTO: NORMAL FL
MONOCYTES ABSOLUTE: NORMAL
MONOCYTES RELATIVE PERCENT: NORMAL
NEUTROPHILS ABSOLUTE: NORMAL
NEUTROPHILS RELATIVE PERCENT: NORMAL
NONHDLC SERPL-MCNC: NORMAL MG/DL
PDW BLD-RTO: NORMAL %
PLATELET # BLD: NORMAL 10*3/UL
PMV BLD AUTO: NORMAL FL
POTASSIUM SERPL-SCNC: NORMAL MMOL/L
RBC # BLD: NORMAL 10*6/UL
SODIUM BLD-SCNC: NORMAL MMOL/L
T4 FREE: NORMAL
TOTAL PROTEIN: NORMAL
TRIGL SERPL-MCNC: 143 MG/DL
TSH SERPL DL<=0.05 MIU/L-ACNC: NORMAL M[IU]/L
VITAMIN D 25-HYDROXY: NORMAL
VITAMIN D2, 25 HYDROXY: NORMAL
VITAMIN D3,25 HYDROXY: NORMAL
VLDLC SERPL CALC-MCNC: 29 MG/DL
WBC # BLD: NORMAL 10*3/UL

## 2023-02-07 DIAGNOSIS — R53.83 FATIGUE, UNSPECIFIED TYPE: ICD-10-CM

## 2023-02-07 DIAGNOSIS — Z00.00 WELL ADULT EXAM: ICD-10-CM

## 2023-02-07 DIAGNOSIS — F41.9 ANXIETY: ICD-10-CM

## 2023-02-07 DIAGNOSIS — F51.01 PRIMARY INSOMNIA: ICD-10-CM

## 2023-02-07 DIAGNOSIS — E03.9 ACQUIRED HYPOTHYROIDISM: ICD-10-CM

## 2023-02-07 DIAGNOSIS — E66.01 CLASS 3 SEVERE OBESITY DUE TO EXCESS CALORIES WITH SERIOUS COMORBIDITY AND BODY MASS INDEX (BMI) OF 50.0 TO 59.9 IN ADULT (HCC): ICD-10-CM

## 2023-02-23 ENCOUNTER — TELEPHONE (OUTPATIENT)
Dept: FAMILY MEDICINE CLINIC | Age: 53
End: 2023-02-23

## 2023-02-23 DIAGNOSIS — F41.9 ANXIETY: ICD-10-CM

## 2023-02-23 NOTE — TELEPHONE ENCOUNTER
Patient called in to get her lab results they were given after giving the patients her results she had some concerns about her thyroid as well as her cholesterol and stated she see her numbers were off and wants to know what should she do about this         Please advise

## 2023-02-24 RX ORDER — ALPRAZOLAM 1 MG/1
TABLET ORAL
Qty: 30 TABLET | Refills: 2 | Status: SHIPPED | OUTPATIENT
Start: 2023-02-24 | End: 2023-03-26

## 2023-02-24 RX ORDER — TIRZEPATIDE 12.5 MG/.5ML
12.5 INJECTION, SOLUTION SUBCUTANEOUS WEEKLY
Qty: 6 ML | Refills: 2 | Status: SHIPPED | OUTPATIENT
Start: 2023-02-24

## 2023-02-24 NOTE — TELEPHONE ENCOUNTER
These results last week  She needs to stop vitamin D and recheck in 3 months  Her thyroid is okay if I were to decrease her medicine I think at this point it would make her feel more sluggish and more issues with weight loss

## 2023-02-28 RX ORDER — LEVOTHYROXINE SODIUM 0.05 MG/1
TABLET ORAL
Qty: 112 TABLET | Refills: 0 | Status: SHIPPED | OUTPATIENT
Start: 2023-02-28 | End: 2023-03-28

## 2023-02-28 RX ORDER — FLUOXETINE HYDROCHLORIDE 20 MG/1
CAPSULE ORAL
Qty: 84 CAPSULE | Refills: 0 | Status: SHIPPED | OUTPATIENT
Start: 2023-02-28 | End: 2023-03-28

## 2023-02-28 RX ORDER — ERGOCALCIFEROL 1.25 MG/1
CAPSULE ORAL
Qty: 12 CAPSULE | Refills: 0 | Status: SHIPPED | OUTPATIENT
Start: 2023-02-28 | End: 2023-03-28

## 2023-02-28 RX ORDER — ATORVASTATIN CALCIUM 10 MG/1
TABLET, FILM COATED ORAL
Qty: 28 TABLET | Refills: 0 | Status: SHIPPED | OUTPATIENT
Start: 2023-02-28 | End: 2023-03-28

## 2023-02-28 RX ORDER — VITAMIN A, VITAMIN C, VITAMIN D-3, VITAMIN E, VITAMIN B-1, VITAMIN B-2, NIACIN, VITAMIN B-6, CALCIUM, IRON, ZINC, COPPER 4000; 120; 400; 22; 1.84; 3; 20; 10; 1; 12; 200; 27; 25; 2 [IU]/1; MG/1; [IU]/1; MG/1; MG/1; MG/1; MG/1; MG/1; MG/1; UG/1; MG/1; MG/1; MG/1; MG/1
TABLET ORAL
Qty: 28 TABLET | Refills: 0 | Status: SHIPPED | OUTPATIENT
Start: 2023-02-28 | End: 2023-03-28

## 2023-03-15 ENCOUNTER — OFFICE VISIT (OUTPATIENT)
Dept: FAMILY MEDICINE CLINIC | Age: 53
End: 2023-03-15
Payer: COMMERCIAL

## 2023-03-15 VITALS
BODY MASS INDEX: 45 KG/M2 | HEART RATE: 87 BPM | DIASTOLIC BLOOD PRESSURE: 80 MMHG | OXYGEN SATURATION: 98 % | HEIGHT: 63 IN | SYSTOLIC BLOOD PRESSURE: 148 MMHG | WEIGHT: 254 LBS

## 2023-03-15 DIAGNOSIS — E66.01 MORBID OBESITY (HCC): ICD-10-CM

## 2023-03-15 DIAGNOSIS — E03.9 ACQUIRED HYPOTHYROIDISM: Primary | ICD-10-CM

## 2023-03-15 DIAGNOSIS — E78.2 MIXED HYPERLIPIDEMIA: ICD-10-CM

## 2023-03-15 PROCEDURE — 99213 OFFICE O/P EST LOW 20 MIN: CPT | Performed by: FAMILY MEDICINE

## 2023-03-15 SDOH — ECONOMIC STABILITY: TRANSPORTATION INSECURITY
IN THE PAST 12 MONTHS, HAS LACK OF TRANSPORTATION KEPT YOU FROM MEETINGS, WORK, OR FROM GETTING THINGS NEEDED FOR DAILY LIVING?: NO

## 2023-03-15 SDOH — ECONOMIC STABILITY: FOOD INSECURITY: WITHIN THE PAST 12 MONTHS, YOU WORRIED THAT YOUR FOOD WOULD RUN OUT BEFORE YOU GOT MONEY TO BUY MORE.: NEVER TRUE

## 2023-03-15 SDOH — ECONOMIC STABILITY: FOOD INSECURITY: WITHIN THE PAST 12 MONTHS, THE FOOD YOU BOUGHT JUST DIDN'T LAST AND YOU DIDN'T HAVE MONEY TO GET MORE.: NEVER TRUE

## 2023-03-15 SDOH — ECONOMIC STABILITY: HOUSING INSECURITY
IN THE LAST 12 MONTHS, WAS THERE A TIME WHEN YOU DID NOT HAVE A STEADY PLACE TO SLEEP OR SLEPT IN A SHELTER (INCLUDING NOW)?: NO

## 2023-03-15 SDOH — ECONOMIC STABILITY: INCOME INSECURITY: HOW HARD IS IT FOR YOU TO PAY FOR THE VERY BASICS LIKE FOOD, HOUSING, MEDICAL CARE, AND HEATING?: NOT VERY HARD

## 2023-03-15 ASSESSMENT — PATIENT HEALTH QUESTIONNAIRE - PHQ9
SUM OF ALL RESPONSES TO PHQ QUESTIONS 1-9: 0
SUM OF ALL RESPONSES TO PHQ QUESTIONS 1-9: 0
SUM OF ALL RESPONSES TO PHQ9 QUESTIONS 1 & 2: 0
SUM OF ALL RESPONSES TO PHQ QUESTIONS 1-9: 0
1. LITTLE INTEREST OR PLEASURE IN DOING THINGS: 0
2. FEELING DOWN, DEPRESSED OR HOPELESS: 0
SUM OF ALL RESPONSES TO PHQ QUESTIONS 1-9: 0

## 2023-03-16 NOTE — PROGRESS NOTES
[facility]  [address] [place] [address] [address] [facility] [address]  Dept: [phone]      Shirlene Lanes is a 47 y.o. female who presents today for follow up on her  medical conditions as noted below. Chief Complaint   Patient presents with    Weight Loss       Patient Active Problem List:     Anxiety     Morbid obesity ([facility] [address]     No past medical history on file. No past surgical history on file. No family history on file. Current Outpatient Medications   Medication Sig Dispense Refill    vitamin D (ERGOCALCIFEROL) 1.25 MG (34058 UT) CAPS capsule TAKE (1) CAPSULE ON MONDAY, WEDNESDAY & FRIDAY.  12 capsule 0    FLUoxetine (PROZAC) 20 MG capsule TAKE (3) CAPSULES EVERY MORNING 84 capsule 0    atorvastatin (LIPITOR) 10 MG tablet TAKE 1 TABLET AT BEDTIME 28 tablet 0    levothyroxine (SYNTHROID) 50 MCG tablet TAKE (4) TABLETS EVERY MORNING 112 tablet 0    Prenatal Vit-Fe Fumarate-FA (PRENATAL VITAMIN PLUS LOW IRON) 27-1 MG TABS TAKE 1 TABLET AT BEDTIME 28 tablet 0    metFORMIN (GLUCOPHAGE) 500 MG tablet TAKE (1) TABLET EVERY MORNING AND BEDTIME 56 tablet 0    Tirzepatide (MOUNJARO) 12.5 MG/0.5ML SOPN SC injection Inject 0.5 mLs into the skin once a week 6 mL 2    ALPRAZolam (XANAX) 1 MG tablet 1 po qd prn 30 tablet 2    Tirzepatide (MOUNJARO) 10 MG/0.5ML SOPN SC injection Inject 0.5 mLs into the skin once a week 2 mL 2    Tirzepatide (MOUNJARO) 10 MG/0.5ML SOPN SC injection Inject 0.5 mLs into the skin once a week 4 Adjustable Dose Pre-filled Pen Syringe 3    scopolamine (TRANSDERM-SCOP, 1.5 MG,) transdermal patch Place 1 patch onto the skin every 72 hours 5 patch 1    Cyanocobalamin (NASCOBAL) 500 MCG/0.1ML SOLN nasal spray 1 spray by Nasal route once a week 1 each 11    clindamycin (CLEOCIN T) 1 % external solution Apply 1 UNSPECIFIED topically 2 times daily 30 mL 0    FEROSUL 325 (65 Fe) MG tablet TAKE 1 TABLET THREE TIMES A WEEK 12 tablet 0    cetirizine (ZYRTEC) 10 MG tablet Take 10 mg by mouth as needed      bumetanide (BUMEX) 2 MG tablet Take 2 mg by mouth daily as needed      NASCOBAL 500 MCG/0.1ML SOLN nasal spray instill 1 spray into each nostril every week      famotidine (PEPCID) 20 MG tablet       CALCIUM CITRATE PO Take by mouth      Insulin Pen Needle (NOVOTWIST) 32G X 5 MM MISC 1 each by Does not apply route daily 100 each 3    Tirzepatide (MOUNJARO) 7.5 MG/0.5ML SOPN SC injection Inject 0.5 mLs into the skin once a week 2 mL 0    Tirzepatide (MOUNJARO) 5 MG/0.5ML SOPN SC injection Inject 0.5 mLs into the skin once a week 2 mL 3    WEGOVY 0.25 MG/0.5ML SOAJ SC injection Inject 0.25 mg into the skin every 7 days (Patient not taking: Reported on 3/23/2022) 2 mL 1     No current facility-administered medications for this visit. ALLERGIES:    Allergies   Allergen Reactions    Latex Rash     Other reaction(s): Unknown    Red Dye Other (See Comments)     Itching rash    Trovafloxacin Itching and Nausea Only    Citalopram Itching and Rash     celexa     Codeine Diarrhea, Other (See Comments) and Nausea And Vomiting    Hydrocodone Hives and Rash       Social History     Tobacco Use    Smoking status: Never    Smokeless tobacco: Never   Substance Use Topics    Alcohol use: Not on file        LDL Calculated (mg/dL)   Date Value   02/04/2023 50     HDL (mg/dL)   Date Value   02/04/2023 47     Hemoglobin A1C (%)   Date Value   05/21/2021 5.4              Subjective:      HPI  she is being seen today for follow-up on weight loss diabetes hypertension hyperlipidemia hypothyroidism she is lost almost 50 pounds since September she is doing great she wanted to review her recent laboratory studies    Review of Systems:     Constitutional: Negative for fever, appetite change and fatigue. Family social and medical history reviewed and unchanged     HENT: Negative. Negative for nosebleeds, trouble swallowing and neck pain.     Eyes: Negative for photophobia and visual disturbance. Respiratory: Negative. Negative for chest tightness and shortness of breath. Cardiovascular: Negative. Negative for chest pain and leg swelling. Gastrointestinal: Negative. Negative for abdominal pain and blood in stool. Endocrine: Negative for cold intolerance and polyuria. Genitourinary: Negative for dysuria and hematuria. Musculoskeletal: Negative. Skin: Negative for rash. Allergic/Immunologic: Negative. Neurological: Negative. Negative for dizziness, weakness and numbness. Hematological: Negative. Negative for adenopathy. Does not bruise/bleed easily. Psychiatric/Behavioral: Negative for sleep disturbance, dysphoric mood and  decreased concentration. The patient is not nervous/anxious. Objective:     Physical Exam:     Nursing note and vitals reviewed. BP (!) 148/80   Pulse 87   Ht 5' 3\" (1.6 m)   Wt 254 lb (115.2 kg)   SpO2 98%   BMI 44.99 kg/m²   Constitutional: She is oriented to person, place, and time. She   appears well-developed and well-nourished. HENT:   Head: Normocephalic and atraumatic. Right Ear: External ear normal. Tympanic membrane is not erythematous. No middle ear effusion. Left Ear: External ear normal. Tympanic membrane is not erythematous. No middle ear effusion. Nose: No mucosal edema. Mouth/Throat: Oropharynx is clear and moist. No posterior oropharyngeal erythema. Eyes: Conjunctivae and EOM are normal. Pupils are equal, round, and reactive to light. Neck: Normal range of motion. Neck supple. No thyromegaly present. Cardiovascular: Normal rate, regular rhythm and normal heart sounds. No murmur heard. Pulmonary/Chest: Effort normal and breath sounds normal. She has no wheezes. Shehas no rales. Abdominal: Soft. Bowel sounds are normal. She exhibits no distension and no mass. There is no tenderness. There is no rebound and no guarding.    Genitourinary/Anorectal:deferred  Musculoskeletal: Normal range of motion. She exhibits no edema or tenderness. Lymphadenopathy: She has no cervical adenopathy. Neurological: She is alert and oriented to person, place, and time. She has normal reflexes. Skin: Skin is warm and dry. No rash noted. Psychiatric: She has a normal mood and affect. Her   behavior is normal.       Assessment:      1. Acquired hypothyroidism    2. Mixed hyperlipidemia    3. Morbid obesity ([address]          Plan:      Call or return to clinic prn if these symptoms worsen or fail to improve as anticipated. I have reviewed the instructions with the patient, answering all questions to her satisfaction. I want her to continue her current medications and not change the doses going forward we will probably need to make adjustments  No follow-ups on file. No orders of the defined types were placed in this encounter. No orders of the defined types were placed in this encounter.       Electronically signed by Maciej Mcduffie DO on 3/16/2023 at 4:23 PM

## 2023-03-17 DIAGNOSIS — F41.9 ANXIETY: Primary | ICD-10-CM

## 2023-03-17 RX ORDER — TRAZODONE HYDROCHLORIDE 50 MG/1
50 TABLET ORAL NIGHTLY PRN
Qty: 30 TABLET | Refills: 5 | Status: SHIPPED | OUTPATIENT
Start: 2023-03-17

## 2023-03-28 RX ORDER — VITAMIN A, VITAMIN C, VITAMIN D-3, VITAMIN E, VITAMIN B-1, VITAMIN B-2, NIACIN, VITAMIN B-6, CALCIUM, IRON, ZINC, COPPER 4000; 120; 400; 22; 1.84; 3; 20; 10; 1; 12; 200; 27; 25; 2 [IU]/1; MG/1; [IU]/1; MG/1; MG/1; MG/1; MG/1; MG/1; MG/1; UG/1; MG/1; MG/1; MG/1; MG/1
TABLET ORAL
Qty: 28 TABLET | Refills: 0 | Status: SHIPPED | OUTPATIENT
Start: 2023-03-28

## 2023-03-28 RX ORDER — ATORVASTATIN CALCIUM 10 MG/1
TABLET, FILM COATED ORAL
Qty: 28 TABLET | Refills: 0 | Status: SHIPPED | OUTPATIENT
Start: 2023-03-28

## 2023-03-28 RX ORDER — FLUOXETINE HYDROCHLORIDE 20 MG/1
CAPSULE ORAL
Qty: 84 CAPSULE | Refills: 0 | Status: SHIPPED | OUTPATIENT
Start: 2023-03-28

## 2023-03-28 RX ORDER — ERGOCALCIFEROL 1.25 MG/1
CAPSULE ORAL
Qty: 12 CAPSULE | Refills: 0 | Status: SHIPPED | OUTPATIENT
Start: 2023-03-28

## 2023-03-28 RX ORDER — LEVOTHYROXINE SODIUM 0.05 MG/1
TABLET ORAL
Qty: 112 TABLET | Refills: 0 | Status: SHIPPED | OUTPATIENT
Start: 2023-03-28

## 2023-03-28 NOTE — TELEPHONE ENCOUNTER
Keshia Caller is calling to request a refill on the following medication(s):    Last Visit Date (If Applicable):  6/11/1242    Next Visit Date:    Visit date not found    Medication Request:  Requested Prescriptions     Pending Prescriptions Disp Refills    metFORMIN (GLUCOPHAGE) 500 MG tablet [Pharmacy Med Name: METFORMIN  MG TABLET] 56 tablet 0     Sig: TAKE (1) TABLET EVERY MORNING AND BEDTIME    Prenatal Vit-Fe Fumarate-FA (PRENATAL VITAMIN PLUS LOW IRON) 27-1 MG TABS [Pharmacy Med Name: PRENATAL VITAMIN PLUS TABS] 28 tablet 0     Sig: TAKE 1 TABLET AT BEDTIME

## 2023-03-28 NOTE — TELEPHONE ENCOUNTER
Sae Valdez is calling to request a refill on the following medication(s):    Last Visit Date (If Applicable):  1/79/7065    Next Visit Date:    Visit date not found    Medication Request:  Requested Prescriptions     Pending Prescriptions Disp Refills    levothyroxine (SYNTHROID) 50 MCG tablet [Pharmacy Med Name: LEVOTHYROXINE 50 MCG TABLET] 112 tablet 0     Sig: TAKE (4) TABLETS EVERY MORNING    atorvastatin (LIPITOR) 10 MG tablet [Pharmacy Med Name: ATORVASTATIN 10 MG TABLET] 28 tablet 0     Sig: TAKE 1 TABLET AT BEDTIME    FLUoxetine (PROZAC) 20 MG capsule [Pharmacy Med Name: FLUOXETINE HCL 20 MG CAPSULE] 84 capsule 0     Sig: TAKE (3) CAPSULES EVERY MORNING    vitamin D (ERGOCALCIFEROL) 1.25 MG (16779 UT) CAPS capsule [Pharmacy Med Name: VITAMIN D2 1.25MG(50,000 UNIT)] 12 capsule 0     Sig: TAKE (1) CAPSULE ON MONDAY, [address]

## 2023-04-25 RX ORDER — ERGOCALCIFEROL 1.25 MG/1
CAPSULE ORAL
Qty: 12 CAPSULE | Refills: 0 | Status: SHIPPED | OUTPATIENT
Start: 2023-04-25

## 2023-04-25 RX ORDER — LEVOTHYROXINE SODIUM 0.05 MG/1
TABLET ORAL
Qty: 112 TABLET | Refills: 0 | Status: SHIPPED | OUTPATIENT
Start: 2023-04-25

## 2023-04-25 RX ORDER — FLUOXETINE HYDROCHLORIDE 20 MG/1
CAPSULE ORAL
Qty: 84 CAPSULE | Refills: 0 | Status: SHIPPED | OUTPATIENT
Start: 2023-04-25

## 2023-04-25 RX ORDER — VITAMIN A, VITAMIN C, VITAMIN D-3, VITAMIN E, VITAMIN B-1, VITAMIN B-2, NIACIN, VITAMIN B-6, CALCIUM, IRON, ZINC, COPPER 4000; 120; 400; 22; 1.84; 3; 20; 10; 1; 12; 200; 27; 25; 2 [IU]/1; MG/1; [IU]/1; MG/1; MG/1; MG/1; MG/1; MG/1; MG/1; UG/1; MG/1; MG/1; MG/1; MG/1
TABLET ORAL
Qty: 28 TABLET | Refills: 0 | Status: SHIPPED | OUTPATIENT
Start: 2023-04-25

## 2023-04-25 RX ORDER — ATORVASTATIN CALCIUM 10 MG/1
TABLET, FILM COATED ORAL
Qty: 28 TABLET | Refills: 0 | Status: SHIPPED | OUTPATIENT
Start: 2023-04-25

## 2023-04-25 NOTE — TELEPHONE ENCOUNTER
Scott Favre is calling to request a refill on the following medication(s):    Last Visit Date (If Applicable):  6/84/0928    Next Visit Date:    Visit date not found    Medication Request:  Requested Prescriptions     Pending Prescriptions Disp Refills    vitamin D (ERGOCALCIFEROL) 1.25 MG (35109 UT) CAPS capsule [Pharmacy Med Name: VITAMIN D2 1.25MG(50,000 UNIT)] 12 capsule 0     Sig: TAKE (1) CAPSULE ON MONDAY, [address].     FLUoxetine (PROZAC) 20 MG capsule [Pharmacy Med Name: FLUOXETINE HCL 20 MG CAPSULE] 84 capsule 0     Sig: TAKE (3) CAPSULES EVERY MORNING    atorvastatin (LIPITOR) 10 MG tablet [Pharmacy Med Name: ATORVASTATIN 10 MG TABLET] 28 tablet 0     Sig: TAKE 1 TABLET AT BEDTIME    levothyroxine (SYNTHROID) 50 MCG tablet [Pharmacy Med Name: LEVOTHYROXINE 50 MCG TABLET] 112 tablet 0     Sig: TAKE (4) TABLETS EVERY MORNING    Prenatal Vit-Fe Fumarate-FA (PRENATAL VITAMIN PLUS LOW IRON) 27-1 MG TABS [Pharmacy Med Name: PRENATAL VITAMIN PLUS TABS] 28 tablet 0     Sig: TAKE 1 TABLET AT BEDTIME    metFORMIN (GLUCOPHAGE) 500 MG tablet [Pharmacy Med Name: METFORMIN  MG TABLET] 56 tablet 0     Sig: TAKE (1) TABLET EVERY MORNING AND BEDTIME

## 2023-04-27 DIAGNOSIS — E66.01 MORBID OBESITY (HCC): Primary | ICD-10-CM

## 2023-04-27 DIAGNOSIS — E03.9 ACQUIRED HYPOTHYROIDISM: ICD-10-CM

## 2023-05-01 LAB
AVERAGE GLUCOSE: 100
HBA1C MFR BLD: 5.1 %

## 2023-05-02 DIAGNOSIS — E66.01 MORBID OBESITY (HCC): ICD-10-CM

## 2023-05-02 DIAGNOSIS — E03.9 ACQUIRED HYPOTHYROIDISM: ICD-10-CM

## 2023-05-05 ENCOUNTER — PATIENT MESSAGE (OUTPATIENT)
Dept: FAMILY MEDICINE CLINIC | Age: 53
End: 2023-05-05

## 2023-05-08 DIAGNOSIS — E66.01 MORBID OBESITY (HCC): ICD-10-CM

## 2023-05-08 DIAGNOSIS — E03.9 ACQUIRED HYPOTHYROIDISM: ICD-10-CM

## 2023-05-08 NOTE — TELEPHONE ENCOUNTER
From: Jennifer Adam  To: Dr. Jose Deras: 5/5/2023 12:21 PM EDT  Subject: FRGMRD    Since the prior off and appeal, for the [facility] was denied, can you please send a prescription to [facility] drug for [facility]MELANIE WAITE. That will probably need a prior authorization as well.  Thanks

## 2023-05-25 DIAGNOSIS — F41.9 ANXIETY: ICD-10-CM

## 2023-05-25 NOTE — TELEPHONE ENCOUNTER
Antoinette Salmon is calling to request a refill on the following medication(s):    Last Visit Date (If Applicable):  2/69/6096    Next Visit Date:    Visit date not found    Medication Request:  Requested Prescriptions     Pending Prescriptions Disp Refills    Prenatal Vit-Fe Fumarate-FA (PRENATAL VITAMIN PLUS LOW IRON) 27-1 MG TABS [Pharmacy Med Name: PRENATAL VITAMIN PLUS TABS] 28 tablet 0     Sig: TAKE 1 TABLET AT BEDTIME    metFORMIN (GLUCOPHAGE) 500 MG tablet [Pharmacy Med Name: METFORMIN  MG TABLET] 56 tablet 0     Sig: TAKE (1) TABLET EVERY MORNING AND BEDTIME    levothyroxine (SYNTHROID) 50 MCG tablet [Pharmacy Med Name: LEVOTHYROXINE 50 MCG TABLET] 112 tablet 0     Sig: TAKE (4) TABLETS EVERY MORNING    atorvastatin (LIPITOR) 10 MG tablet [Pharmacy Med Name: ATORVASTATIN 10 MG TABLET] 28 tablet 0     Sig: TAKE 1 TABLET AT BEDTIME    FLUoxetine (PROZAC) 20 MG capsule [Pharmacy Med Name: FLUOXETINE HCL 20 MG CAPSULE] 84 capsule 0     Sig: TAKE (3) CAPSULES EVERY MORNING    vitamin D (ERGOCALCIFEROL) 1.25 MG (15950 UT) CAPS capsule [Pharmacy Med Name: VITAMIN D2 1.25MG(50,000 UNIT)] 12 capsule 0     Sig: TAKE (1) CAPSULE ON MONDAY, [address].

## 2023-05-25 NOTE — TELEPHONE ENCOUNTER
Shena Neville is calling to request a refill on the following medication(s):    Last Visit Date (If Applicable):  8/17/2886    Next Visit Date:    Visit date not found    Medication Request:  Requested Prescriptions     Pending Prescriptions Disp Refills    traZODone (DESYREL) 50 MG tablet 30 tablet 5     Sig: Take 1 tablet by mouth nightly as needed for Sleep

## 2023-05-26 RX ORDER — ATORVASTATIN CALCIUM 10 MG/1
TABLET, FILM COATED ORAL
Qty: 28 TABLET | Refills: 0 | Status: SHIPPED | OUTPATIENT
Start: 2023-05-26

## 2023-05-26 RX ORDER — LEVOTHYROXINE SODIUM 0.05 MG/1
TABLET ORAL
Qty: 112 TABLET | Refills: 0 | Status: SHIPPED | OUTPATIENT
Start: 2023-05-26

## 2023-05-26 RX ORDER — FLUOXETINE HYDROCHLORIDE 20 MG/1
CAPSULE ORAL
Qty: 84 CAPSULE | Refills: 0 | Status: SHIPPED | OUTPATIENT
Start: 2023-05-26

## 2023-05-26 RX ORDER — TRAZODONE HYDROCHLORIDE 50 MG/1
50 TABLET ORAL NIGHTLY PRN
Qty: 90 TABLET | Refills: 1 | Status: SHIPPED | OUTPATIENT
Start: 2023-05-26

## 2023-05-26 RX ORDER — ERGOCALCIFEROL 1.25 MG/1
CAPSULE ORAL
Qty: 12 CAPSULE | Refills: 0 | Status: SHIPPED | OUTPATIENT
Start: 2023-05-26

## 2023-05-26 RX ORDER — VITAMIN A, VITAMIN C, VITAMIN D-3, VITAMIN E, VITAMIN B-1, VITAMIN B-2, NIACIN, VITAMIN B-6, CALCIUM, IRON, ZINC, COPPER 4000; 120; 400; 22; 1.84; 3; 20; 10; 1; 12; 200; 27; 25; 2 [IU]/1; MG/1; [IU]/1; MG/1; MG/1; MG/1; MG/1; MG/1; MG/1; UG/1; MG/1; MG/1; MG/1; MG/1
TABLET ORAL
Qty: 28 TABLET | Refills: 0 | Status: SHIPPED | OUTPATIENT
Start: 2023-05-26

## 2023-05-30 ENCOUNTER — PATIENT MESSAGE (OUTPATIENT)
Dept: FAMILY MEDICINE CLINIC | Age: 53
End: 2023-05-30

## 2023-05-30 NOTE — TELEPHONE ENCOUNTER
From: Gilmar Mendoza  To: Dr. Raven Preciado: 5/30/2023 4:08 PM EDT  Subject: Famotidine    I need refills on my Famotidine. I am taking 10mg twice a galen for reflux and the gastric sleeve. Please fax the prescription to the [address] [place] [address] [facility] at [phone] today if possible. I cant get any of the weight loss meds covered and they are 1100 per month. Can I go back on the Adipex?

## 2023-05-31 RX ORDER — FAMOTIDINE 20 MG/1
20 TABLET, FILM COATED ORAL 2 TIMES DAILY
Qty: 180 TABLET | Refills: 3 | Status: SHIPPED | OUTPATIENT
Start: 2023-05-31

## 2023-05-31 RX ORDER — VITAMIN A, VITAMIN C, VITAMIN D-3, VITAMIN E, VITAMIN B-1, VITAMIN B-2, NIACIN, VITAMIN B-6, CALCIUM, IRON, ZINC, COPPER 4000; 120; 400; 22; 1.84; 3; 20; 10; 1; 12; 200; 27; 25; 2 [IU]/1; MG/1; [IU]/1; MG/1; MG/1; MG/1; MG/1; MG/1; MG/1; UG/1; MG/1; MG/1; MG/1; MG/1
1 TABLET ORAL NIGHTLY
Qty: 90 TABLET | Refills: 3 | Status: SHIPPED | OUTPATIENT
Start: 2023-05-31

## 2023-06-06 ENCOUNTER — TELEPHONE (OUTPATIENT)
Dept: FAMILY MEDICINE CLINIC | Age: 53
End: 2023-06-06

## 2023-06-08 ENCOUNTER — TELEMEDICINE (OUTPATIENT)
Dept: FAMILY MEDICINE CLINIC | Age: 53
End: 2023-06-08
Payer: COMMERCIAL

## 2023-06-08 DIAGNOSIS — B96.89 ACUTE BACTERIAL SINUSITIS: Primary | ICD-10-CM

## 2023-06-08 DIAGNOSIS — J01.90 ACUTE BACTERIAL SINUSITIS: Primary | ICD-10-CM

## 2023-06-08 DIAGNOSIS — F41.9 ANXIETY: ICD-10-CM

## 2023-06-08 DIAGNOSIS — E66.01 MORBID OBESITY (HCC): ICD-10-CM

## 2023-06-08 PROCEDURE — 99214 OFFICE O/P EST MOD 30 MIN: CPT | Performed by: FAMILY MEDICINE

## 2023-06-08 RX ORDER — TIRZEPATIDE 5 MG/.5ML
5 INJECTION, SOLUTION SUBCUTANEOUS WEEKLY
Qty: 4 ADJUSTABLE DOSE PRE-FILLED PEN SYRINGE | Refills: 2 | Status: SHIPPED | OUTPATIENT
Start: 2023-06-08

## 2023-06-08 RX ORDER — ALPRAZOLAM 1 MG/1
TABLET ORAL
Qty: 30 TABLET | Refills: 0 | Status: SHIPPED | OUTPATIENT
Start: 2023-06-08 | End: 2023-07-08

## 2023-06-08 RX ORDER — PHENTERMINE HYDROCHLORIDE 37.5 MG/1
37.5 TABLET ORAL
Qty: 30 TABLET | Refills: 0 | Status: SHIPPED | OUTPATIENT
Start: 2023-06-08 | End: 2023-07-08

## 2023-06-08 NOTE — PROGRESS NOTES
Pursuant to the emergency declaration under the [facility], [address] [place] [address] authority and the [employer] Act, this Virtual Visit was conducted with patient's (and/or legal guardian's) consent, to reduce the patient's risk of exposure to COVID-19 and provide necessary medical care. The patient (and/or legal guardian) has also been advised to contact this office for worsening conditions or problems, and seek emergency medical treatment and/or call 911 if deemed necessary. Patient identification was verified at the start of the visit: Yes    Total time spent on this encounter: Not billed by time    Services were provided through a video synchronous discussion virtually to substitute for in-person clinic visit. Patient and provider were located at their individual homes. --Brenton Pham DO on 6/8/2023 at 8:58 AM    An electronic signature was used to authenticate this note.

## 2023-06-21 RX ORDER — ATORVASTATIN CALCIUM 10 MG/1
TABLET, FILM COATED ORAL
Qty: 28 TABLET | Refills: 0 | Status: SHIPPED | OUTPATIENT
Start: 2023-06-21

## 2023-06-21 RX ORDER — FLUOXETINE HYDROCHLORIDE 20 MG/1
CAPSULE ORAL
Qty: 84 CAPSULE | Refills: 0 | Status: SHIPPED | OUTPATIENT
Start: 2023-06-21

## 2023-06-21 RX ORDER — PNV,CALCIUM 72/IRON/FOLIC ACID 27 MG-1 MG
TABLET ORAL
Qty: 28 TABLET | Refills: 0 | Status: SHIPPED | OUTPATIENT
Start: 2023-06-21

## 2023-06-21 RX ORDER — ERGOCALCIFEROL 1.25 MG/1
CAPSULE ORAL
Qty: 12 CAPSULE | Refills: 0 | Status: SHIPPED | OUTPATIENT
Start: 2023-06-21

## 2023-06-21 RX ORDER — LEVOTHYROXINE SODIUM 0.05 MG/1
TABLET ORAL
Qty: 112 TABLET | Refills: 0 | Status: SHIPPED | OUTPATIENT
Start: 2023-06-21

## 2023-06-21 NOTE — TELEPHONE ENCOUNTER
Heather Shai is calling to request a refill on the following medication(s):    Last Visit Date (If Applicable):  2/5/8829    Next Visit Date:    Visit date not found    Medication Request:  Requested Prescriptions     Pending Prescriptions Disp Refills    vitamin D (ERGOCALCIFEROL) 1.25 MG (86855 UT) CAPS capsule [Pharmacy Med Name: VITAMIN D2 1.25MG(50,000 UNIT)] 12 capsule 0     Sig: TAKE (1) CAPSULE ON MONDAY, [address]     FLUoxetine (PROZAC) 20 MG capsule [Pharmacy Med Name: FLUOXETINE HCL 20 MG CAPSULE] 84 capsule 0     Sig: TAKE (3) CAPSULES EVERY MORNING    atorvastatin (LIPITOR) 10 MG tablet [Pharmacy Med Name: ATORVASTATIN 10 MG TABLET] 28 tablet 0     Sig: TAKE 1 TABLET AT BEDTIME    levothyroxine (SYNTHROID) 50 MCG tablet [Pharmacy Med Name: LEVOTHYROXINE 50 MCG TABLET] 112 tablet 0     Sig: TAKE (4) TABLETS EVERY MORNING    metFORMIN (GLUCOPHAGE) 500 MG tablet [Pharmacy Med Name: METFORMIN  MG TABLET] 56 tablet 0     Sig: TAKE (1) TABLET EVERY MORNING AND BEDTIME    Prenatal Vit-Fe Fumarate-FA (WESTAB PLUS) 27-1 MG TABS [Pharmacy Med Name: WESTAB PLUS TABLET] 28 tablet 0     Sig: TAKE 1 TABLET AT BEDTIME

## 2023-07-03 DIAGNOSIS — E66.01 MORBID OBESITY (HCC): ICD-10-CM

## 2023-07-03 RX ORDER — PHENTERMINE HYDROCHLORIDE 37.5 MG/1
37.5 TABLET ORAL
Qty: 30 TABLET | Refills: 0 | OUTPATIENT
Start: 2023-07-03 | End: 2023-08-02

## 2023-07-05 RX ORDER — TIRZEPATIDE 10 MG/.5ML
10 INJECTION, SOLUTION SUBCUTANEOUS WEEKLY
Qty: 12 ADJUSTABLE DOSE PRE-FILLED PEN SYRINGE | Refills: 0 | Status: SHIPPED | OUTPATIENT
Start: 2023-07-05

## 2023-07-13 ENCOUNTER — TELEMEDICINE (OUTPATIENT)
Dept: FAMILY MEDICINE CLINIC | Age: 53
End: 2023-07-13
Payer: COMMERCIAL

## 2023-07-13 DIAGNOSIS — E66.01 MORBID OBESITY (HCC): ICD-10-CM

## 2023-07-13 DIAGNOSIS — E03.9 ACQUIRED HYPOTHYROIDISM: Primary | ICD-10-CM

## 2023-07-13 DIAGNOSIS — E66.01 CLASS 3 SEVERE OBESITY DUE TO EXCESS CALORIES WITH SERIOUS COMORBIDITY AND BODY MASS INDEX (BMI) OF 50.0 TO 59.9 IN ADULT (HCC): ICD-10-CM

## 2023-07-13 PROCEDURE — 99213 OFFICE O/P EST LOW 20 MIN: CPT | Performed by: FAMILY MEDICINE

## 2023-07-13 RX ORDER — PHENTERMINE HYDROCHLORIDE 37.5 MG/1
37.5 TABLET ORAL
Qty: 30 TABLET | Refills: 0 | Status: SHIPPED | OUTPATIENT
Start: 2023-07-13 | End: 2023-08-12

## 2023-07-13 ASSESSMENT — PATIENT HEALTH QUESTIONNAIRE - PHQ9
1. LITTLE INTEREST OR PLEASURE IN DOING THINGS: 0
SUM OF ALL RESPONSES TO PHQ QUESTIONS 1-9: 0
SUM OF ALL RESPONSES TO PHQ QUESTIONS 1-9: 0
SUM OF ALL RESPONSES TO PHQ9 QUESTIONS 1 & 2: 0
SUM OF ALL RESPONSES TO PHQ QUESTIONS 1-9: 0
2. FEELING DOWN, DEPRESSED OR HOPELESS: 0
SUM OF ALL RESPONSES TO PHQ QUESTIONS 1-9: 0

## 2023-07-13 NOTE — PROGRESS NOTES
discoloration noted on facial skin         [] Abnormal-            Psychiatric:       [x] Normal Affect [x] No Hallucinations        [] Abnormal-     Other pertinent observable physical exam findings-     ASSESSMENT/PLAN:   Diagnosis Orders   1. Acquired hypothyroidism        2. Class 3 severe obesity due to excess calories with serious comorbidity and body mass index (BMI) of 50.0 to 59.9 in adult [facility])           No orders of the defined types were placed in this encounter. Requested Prescriptions     Signed Prescriptions Disp Refills    phentermine (ADIPEX-P) 37.5 MG tablet 30 tablet 0     Sig: Take 1 tablet by mouth every morning (before breakfast) for 30 days. Max Daily Amount: 37.5 mg     Fu in 4 weeks   No follow-ups on file. Sophia Galvin is a 46 y.o. female being evaluated by a Virtual Visit (video visit) encounter to address concerns as mentioned above. A caregiver was present when appropriate. Due to this being a TeleHealth encounter (During [place] [address] public health emergency), evaluation of the following organ systems was limited: Vitals/Constitutional/EENT/Resp/CV/GI//MS/Neuro/Skin/Heme-Lymph-Imm. Pursuant to the emergency declaration under the [address] and the [employer] Act, this Virtual Visit was conducted with patient's (and/or legal guardian's) consent, to reduce the patient's risk of exposure to COVID-19 and provide necessary medical care. The patient (and/or legal guardian) has also been advised to contact this office for worsening conditions or problems, and seek emergency medical treatment and/or call 911 if deemed necessary. Patient identification was verified at the start of the visit: Yes    Total time spent on this encounter: Not billed by time    Services were provided through a video synchronous discussion virtually to substitute for in-person clinic visit.

## 2023-07-18 RX ORDER — FLUOXETINE HYDROCHLORIDE 20 MG/1
CAPSULE ORAL
Qty: 84 CAPSULE | Refills: 0 | Status: SHIPPED | OUTPATIENT
Start: 2023-07-18

## 2023-07-18 RX ORDER — LEVOTHYROXINE SODIUM 0.05 MG/1
TABLET ORAL
Qty: 112 TABLET | Refills: 0 | Status: SHIPPED | OUTPATIENT
Start: 2023-07-18

## 2023-07-18 RX ORDER — ATORVASTATIN CALCIUM 10 MG/1
TABLET, FILM COATED ORAL
Qty: 28 TABLET | Refills: 0 | Status: SHIPPED | OUTPATIENT
Start: 2023-07-18

## 2023-07-18 RX ORDER — ERGOCALCIFEROL 1.25 MG/1
CAPSULE ORAL
Qty: 12 CAPSULE | Refills: 0 | Status: SHIPPED | OUTPATIENT
Start: 2023-07-18

## 2023-07-18 RX ORDER — PNV,CALCIUM 72/IRON/FOLIC ACID 27 MG-1 MG
TABLET ORAL
Qty: 28 TABLET | Refills: 0 | Status: SHIPPED | OUTPATIENT
Start: 2023-07-18

## 2023-07-18 NOTE — TELEPHONE ENCOUNTER
Parker Larose is calling to request a refill on the following medication(s):    Last Visit Date (If Applicable):  9/36/4349    Next Visit Date:    8/10/2023    Medication Request:  Requested Prescriptions     Pending Prescriptions Disp Refills    Prenatal Vit-Fe Fumarate-FA (WESTAB PLUS) 27-1 MG TABS [Pharmacy Med Name: WESTAB PLUS TABLET] 28 tablet 0     Sig: TAKE 1 TABLET AT BEDTIME    metFORMIN (GLUCOPHAGE) 500 MG tablet [Pharmacy Med Name: METFORMIN  MG TABLET] 56 tablet 0     Sig: TAKE (1) TABLET EVERY MORNING AND BEDTIME    levothyroxine (SYNTHROID) 50 MCG tablet [Pharmacy Med Name: LEVOTHYROXINE 50 MCG TABLET] 112 tablet 0     Sig: TAKE (4) TABLETS EVERY MORNING    atorvastatin (LIPITOR) 10 MG tablet [Pharmacy Med Name: ATORVASTATIN 10 MG TABLET] 28 tablet 0     Sig: TAKE 1 TABLET AT BEDTIME    FLUoxetine (PROZAC) 20 MG capsule [Pharmacy Med Name: FLUOXETINE HCL 20 MG CAPSULE] 84 capsule 0     Sig: TAKE (3) CAPSULES EVERY MORNING    vitamin D (ERGOCALCIFEROL) 1.25 MG (70045 UT) CAPS capsule [Pharmacy Med Name: VITAMIN D2 1.25MG(50,000 UNIT)] 12 capsule 0     Sig: TAKE (1) CAPSULE ON MONDAY, [address] [facility].

## 2023-08-10 ENCOUNTER — TELEMEDICINE (OUTPATIENT)
Dept: FAMILY MEDICINE CLINIC | Age: 53
End: 2023-08-10
Payer: COMMERCIAL

## 2023-08-10 DIAGNOSIS — E66.01 MORBID OBESITY (HCC): ICD-10-CM

## 2023-08-10 DIAGNOSIS — E03.9 ACQUIRED HYPOTHYROIDISM: Primary | ICD-10-CM

## 2023-08-10 PROCEDURE — 99213 OFFICE O/P EST LOW 20 MIN: CPT | Performed by: FAMILY MEDICINE

## 2023-08-10 RX ORDER — TIRZEPATIDE 12.5 MG/.5ML
12.5 INJECTION, SOLUTION SUBCUTANEOUS WEEKLY
Qty: 4 ADJUSTABLE DOSE PRE-FILLED PEN SYRINGE | Refills: 2 | Status: SHIPPED | OUTPATIENT
Start: 2023-08-10

## 2023-08-10 RX ORDER — PHENTERMINE HYDROCHLORIDE 37.5 MG/1
37.5 TABLET ORAL
Qty: 30 TABLET | Refills: 0 | Status: SHIPPED | OUTPATIENT
Start: 2023-08-10 | End: 2023-09-09

## 2023-08-15 RX ORDER — FLUOXETINE HYDROCHLORIDE 20 MG/1
CAPSULE ORAL
Qty: 84 CAPSULE | Refills: 0 | Status: SHIPPED | OUTPATIENT
Start: 2023-08-15

## 2023-08-15 RX ORDER — ATORVASTATIN CALCIUM 10 MG/1
TABLET, FILM COATED ORAL
Qty: 28 TABLET | Refills: 0 | Status: SHIPPED | OUTPATIENT
Start: 2023-08-15

## 2023-08-15 RX ORDER — PNV,CALCIUM 72/IRON/FOLIC ACID 27 MG-1 MG
TABLET ORAL
Qty: 28 TABLET | Refills: 0 | Status: SHIPPED | OUTPATIENT
Start: 2023-08-15

## 2023-08-15 RX ORDER — ERGOCALCIFEROL 1.25 MG/1
CAPSULE ORAL
Qty: 12 CAPSULE | Refills: 0 | Status: SHIPPED | OUTPATIENT
Start: 2023-08-15

## 2023-08-15 RX ORDER — LEVOTHYROXINE SODIUM 0.05 MG/1
TABLET ORAL
Qty: 112 TABLET | Refills: 0 | Status: SHIPPED | OUTPATIENT
Start: 2023-08-15

## 2023-08-15 NOTE — TELEPHONE ENCOUNTER
[facility] is calling to request a refill on the following medication(s):    Last Visit Date (If Applicable):  2/40/4317    Next Visit Date:    Visit date not found    Medication Request:  Requested Prescriptions     Pending Prescriptions Disp Refills    vitamin D (ERGOCALCIFEROL) 1.25 MG (88387 UT) CAPS capsule [Pharmacy Med Name: VITAMIN D2 1.25MG(50,000 UNIT)] 12 capsule 0     Sig: TAKE (1) CAPSULE ON MONDAY, [address] [place] [address].     FLUoxetine (PROZAC) 20 MG capsule [Pharmacy Med Name: FLUOXETINE HCL 20 MG CAPSULE] 84 capsule 0     Sig: TAKE (3) CAPSULES EVERY MORNING    atorvastatin (LIPITOR) 10 MG tablet [Pharmacy Med Name: ATORVASTATIN 10 MG TABLET] 28 tablet 0     Sig: TAKE 1 TABLET AT BEDTIME    levothyroxine (SYNTHROID) 50 MCG tablet [Pharmacy Med Name: LEVOTHYROXINE 50 MCG TABLET] 112 tablet 0     Sig: TAKE (4) TABLETS EVERY MORNING    metFORMIN (GLUCOPHAGE) 500 MG tablet [Pharmacy Med Name: METFORMIN  MG TABLET] 56 tablet 0     Sig: TAKE (1) TABLET EVERY MORNING AND BEDTIME    Prenatal Vit-Fe Fumarate-FA (WESTAB PLUS) 27-1 MG TABS [Pharmacy Med Name: WESTAB PLUS TABLET] 28 tablet 0     Sig: TAKE 1 TABLET AT BEDTIME

## 2023-09-12 NOTE — TELEPHONE ENCOUNTER
Michelle Rodriguez is calling to request a refill on the following medication(s):    Last Visit Date (If Applicable):  2/96/1621    Next Visit Date:    Visit date not found    Medication Request:  Requested Prescriptions     Pending Prescriptions Disp Refills    Prenatal Vit-Fe Fumarate-FA (WESTAB PLUS) 27-1 MG TABS [Pharmacy Med Name: WESTAB PLUS TABLET] 28 tablet 0     Sig: TAKE 1 TABLET AT BEDTIME    metFORMIN (GLUCOPHAGE) 500 MG tablet [Pharmacy Med Name: METFORMIN  MG TABLET] 56 tablet 0     Sig: TAKE (1) TABLET EVERY MORNING AND BEDTIME    levothyroxine (SYNTHROID) 50 MCG tablet [Pharmacy Med Name: LEVOTHYROXINE 50 MCG TABLET] 112 tablet 0     Sig: TAKE (4) TABLETS EVERY MORNING    atorvastatin (LIPITOR) 10 MG tablet [Pharmacy Med Name: ATORVASTATIN 10 MG TABLET] 28 tablet 0     Sig: TAKE 1 TABLET AT BEDTIME    FLUoxetine (PROZAC) 20 MG capsule [Pharmacy Med Name: FLUOXETINE HCL 20 MG CAPSULE] 84 capsule 0     Sig: TAKE (3) CAPSULES EVERY MORNING    vitamin D (ERGOCALCIFEROL) 1.25 MG (05030 UT) CAPS capsule [Pharmacy Med Name: VITAMIN D2 1.25MG(50,000 UNIT)] 12 capsule 0     Sig: TAKE (1) CAPSULE ON MONDAY, [address] [facility].

## 2023-09-13 RX ORDER — FLUOXETINE HYDROCHLORIDE 20 MG/1
CAPSULE ORAL
Qty: 84 CAPSULE | Refills: 5 | Status: SHIPPED | OUTPATIENT
Start: 2023-09-13

## 2023-09-13 RX ORDER — LEVOTHYROXINE SODIUM 0.05 MG/1
TABLET ORAL
Qty: 112 TABLET | Refills: 5 | Status: SHIPPED | OUTPATIENT
Start: 2023-09-13

## 2023-09-13 RX ORDER — ERGOCALCIFEROL 1.25 MG/1
CAPSULE ORAL
Qty: 12 CAPSULE | Refills: 5 | Status: SHIPPED | OUTPATIENT
Start: 2023-09-13

## 2023-09-13 RX ORDER — PNV,CALCIUM 72/IRON/FOLIC ACID 27 MG-1 MG
TABLET ORAL
Qty: 28 TABLET | Refills: 5 | Status: SHIPPED | OUTPATIENT
Start: 2023-09-13

## 2023-09-13 RX ORDER — ATORVASTATIN CALCIUM 10 MG/1
TABLET, FILM COATED ORAL
Qty: 28 TABLET | Refills: 5 | Status: SHIPPED | OUTPATIENT
Start: 2023-09-13

## 2023-10-31 DIAGNOSIS — R11.0 NAUSEA: Primary | ICD-10-CM

## 2023-10-31 DIAGNOSIS — F41.9 ANXIETY: ICD-10-CM

## 2023-10-31 RX ORDER — ONDANSETRON HYDROCHLORIDE 8 MG/1
8 TABLET, FILM COATED ORAL EVERY 8 HOURS PRN
Qty: 30 TABLET | Refills: 0 | Status: SHIPPED | OUTPATIENT
Start: 2023-10-31

## 2023-10-31 RX ORDER — ALPRAZOLAM 1 MG/1
TABLET ORAL
Qty: 30 TABLET | Refills: 0 | Status: SHIPPED | OUTPATIENT
Start: 2023-10-31 | End: 2023-11-30

## 2023-11-07 DIAGNOSIS — E66.01 MORBID OBESITY (HCC): ICD-10-CM

## 2023-11-07 RX ORDER — TIRZEPATIDE 12.5 MG/.5ML
INJECTION, SOLUTION SUBCUTANEOUS
Qty: 2 ML | Refills: 0 | Status: SHIPPED | OUTPATIENT
Start: 2023-11-07

## 2023-11-07 NOTE — TELEPHONE ENCOUNTER
Lyle Archer is calling to request a refill on the following medication(s):    Last Visit Date (If Applicable):  9/30/8603    Next Visit Date:    Visit date not found    Medication Request:  Requested Prescriptions     Pending Prescriptions Disp Refills    MOUNJARO 12.5 MG/0.5ML SOPN SC injection [Pharmacy Med Name: MOUNJARO 12.5 MG/0.5 ML PEN] 2 mL 0     Sig: INJECT 0.5 mL INTO THE SKIN ONCE A WEEK

## 2023-11-14 ENCOUNTER — APPOINTMENT (OUTPATIENT)
Dept: GENERAL RADIOLOGY | Age: 53
End: 2023-11-14
Payer: COMMERCIAL

## 2023-11-14 ENCOUNTER — HOSPITAL ENCOUNTER (EMERGENCY)
Age: 53
Discharge: HOME | End: 2023-11-14
Attending: EMERGENCY MEDICINE
Payer: COMMERCIAL

## 2023-11-14 VITALS
TEMPERATURE: 97.8 F | SYSTOLIC BLOOD PRESSURE: 155 MMHG | DIASTOLIC BLOOD PRESSURE: 83 MMHG | OXYGEN SATURATION: 98 % | HEART RATE: 78 BPM | RESPIRATION RATE: 19 BRPM

## 2023-11-14 DIAGNOSIS — S46.812A STRAIN OF LEFT TRAPEZIUS MUSCLE, INITIAL ENCOUNTER: ICD-10-CM
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
## 2023-11-14 DIAGNOSIS — M79.672 LEFT FOOT PAIN: ICD-10-CM

## 2023-11-14 PROCEDURE — 73030 X-RAY EXAM OF SHOULDER: CPT

## 2023-11-14 PROCEDURE — 6360000002 HC RX W HCPCS: Performed by: STUDENT IN AN ORGANIZED HEALTH CARE EDUCATION/TRAINING PROGRAM

## 2023-11-14 PROCEDURE — 99284 EMERGENCY DEPT VISIT MOD MDM: CPT

## 2023-11-14 PROCEDURE — 96372 THER/PROPH/DIAG INJ SC/IM: CPT

## 2023-11-14 PROCEDURE — 6370000000 HC RX 637 (ALT 250 FOR IP): Performed by: STUDENT IN AN ORGANIZED HEALTH CARE EDUCATION/TRAINING PROGRAM

## 2023-11-14 PROCEDURE — 73630 X-RAY EXAM OF FOOT: CPT

## 2023-11-14 RX ORDER — ACETAMINOPHEN 325 MG/1
650 TABLET ORAL ONCE
Status: COMPLETED | OUTPATIENT
Start: 2023-11-14 | End: 2023-11-14

## 2023-11-14 RX ORDER — ORPHENADRINE CITRATE 30 MG/ML
60 INJECTION INTRAMUSCULAR; INTRAVENOUS ONCE
Status: COMPLETED | OUTPATIENT
Start: 2023-11-14 | End: 2023-11-14

## 2023-11-14 RX ORDER — CYCLOBENZAPRINE HCL 10 MG
10 TABLET ORAL 3 TIMES DAILY PRN
Qty: 10 TABLET | Refills: 0 | Status: SHIPPED | OUTPATIENT
Start: 2023-11-14 | End: 2023-11-24

## 2023-11-14 RX ADMIN — ACETAMINOPHEN 650 MG: 325 TABLET ORAL at 18:26

## 2023-11-14 RX ADMIN — ORPHENADRINE CITRATE 60 MG: 60 INJECTION INTRAMUSCULAR; INTRAVENOUS at 18:27

## 2023-11-14 ASSESSMENT — ENCOUNTER SYMPTOMS
NAUSEA: 0
SHORTNESS OF BREATH: 0
ABDOMINAL PAIN: 0
VOMITING: 0
DIARRHEA: 0
RHINORRHEA: 0
EYE REDNESS: 0

## 2023-11-14 ASSESSMENT — LIFESTYLE VARIABLES
HOW OFTEN DO YOU HAVE A DRINK CONTAINING ALCOHOL: NEVER
HOW MANY STANDARD DRINKS CONTAINING ALCOHOL DO YOU HAVE ON A TYPICAL DAY: PATIENT DOES NOT DRINK
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
## 2023-11-15 NOTE — DISCHARGE INSTRUCTIONS
X-ray imaging was negative for fracture/broken bone in your shoulder as well as in the foot. Your symptoms appear to be due to a muscle strain in the left trapezius muscle. Tylenol can be taken every 6 hours. Ibuprofen can be taken every 6 hours. It is recommended you alternate the two every three hours. Example pain medication schedule:  - 9am: Tylenol (500-1000mg)  - 12 pm/noon: Ibuprofen (400-600mg)  - 3pm: Tylenol  - 6pm: Ibuprofen    Maximum dose of tylenol in a 24 hour period is 4000mg. You may also use the muscle relaxer to help with the muscle strain. Do not drive or operate machinery on the muscle relaxer as this can make you drowsy. Please follow-up with occupational health next business day for reevaluation and continued therapy.     You may also utilize the [facility] resources for follow-up care in regards to trauma clinic etc.

## 2023-12-05 RX ORDER — TIRZEPATIDE 12.5 MG/.5ML
INJECTION, SOLUTION SUBCUTANEOUS
Qty: 2 ML | Refills: 0 | Status: SHIPPED | OUTPATIENT
Start: 2023-12-05

## 2023-12-05 NOTE — TELEPHONE ENCOUNTER
Hannah Silva is calling to request a refill on the following medication(s):    Last Visit Date (If Applicable):  6/79/1506    Next Visit Date:    Visit date not found    Medication Request:  Requested Prescriptions     Pending Prescriptions Disp Refills    MOUNJARO 12.5 MG/0.5ML SOPN SC injection [Pharmacy Med Name: MOUNJARO 12.5 MG/0.5 ML PEN] 2 mL 0     Sig: INJECT 0.5 mL INTO THE SKIN ONCE A WEEK

## 2024-01-11 DIAGNOSIS — F41.9 ANXIETY: ICD-10-CM

## 2024-01-11 RX ORDER — TRAZODONE HYDROCHLORIDE 50 MG/1
50 TABLET ORAL NIGHTLY
Qty: 30 TABLET | Refills: 0 | Status: SHIPPED | OUTPATIENT
Start: 2024-01-11

## 2024-01-25 RX ORDER — TIRZEPATIDE 15 MG/.5ML
15 INJECTION, SOLUTION SUBCUTANEOUS WEEKLY
Qty: 12 ADJUSTABLE DOSE PRE-FILLED PEN SYRINGE | Refills: 3 | Status: SHIPPED | OUTPATIENT
Start: 2024-01-25

## 2024-02-15 DIAGNOSIS — F41.9 ANXIETY: ICD-10-CM

## 2024-02-15 RX ORDER — TRAZODONE HYDROCHLORIDE 50 MG/1
50 TABLET ORAL NIGHTLY
Qty: 30 TABLET | Refills: 0 | Status: SHIPPED | OUTPATIENT
Start: 2024-02-15

## 2024-02-26 RX ORDER — ATORVASTATIN CALCIUM 10 MG/1
TABLET, FILM COATED ORAL
Qty: 28 TABLET | Refills: 0 | Status: SHIPPED | OUTPATIENT
Start: 2024-02-26

## 2024-02-26 RX ORDER — ERGOCALCIFEROL 1.25 MG/1
CAPSULE ORAL
Qty: 12 CAPSULE | Refills: 0 | Status: SHIPPED | OUTPATIENT
Start: 2024-02-26

## 2024-02-26 RX ORDER — PNV,CALCIUM 72/IRON/FOLIC ACID 27 MG-1 MG
TABLET ORAL
Qty: 28 TABLET | Refills: 0 | Status: SHIPPED | OUTPATIENT
Start: 2024-02-26

## 2024-02-26 RX ORDER — FLUOXETINE HYDROCHLORIDE 20 MG/1
CAPSULE ORAL
Qty: 84 CAPSULE | Refills: 0 | Status: SHIPPED | OUTPATIENT
Start: 2024-02-26

## 2024-02-26 RX ORDER — LEVOTHYROXINE SODIUM 0.05 MG/1
TABLET ORAL
Qty: 112 TABLET | Refills: 0 | Status: SHIPPED | OUTPATIENT
Start: 2024-02-26

## 2024-03-19 DIAGNOSIS — F41.9 ANXIETY: ICD-10-CM

## 2024-03-19 RX ORDER — TRAZODONE HYDROCHLORIDE 50 MG/1
50 TABLET ORAL NIGHTLY
Qty: 30 TABLET | Refills: 0 | Status: SHIPPED | OUTPATIENT
Start: 2024-03-19

## 2024-03-25 RX ORDER — ERGOCALCIFEROL 1.25 MG/1
CAPSULE ORAL
Qty: 12 CAPSULE | Refills: 0 | Status: SHIPPED | OUTPATIENT
Start: 2024-03-25

## 2024-03-25 RX ORDER — PNV,CALCIUM 72/IRON/FOLIC ACID 27 MG-1 MG
TABLET ORAL
Qty: 28 TABLET | Refills: 0 | Status: SHIPPED | OUTPATIENT
Start: 2024-03-25

## 2024-03-25 RX ORDER — LEVOTHYROXINE SODIUM 0.05 MG/1
TABLET ORAL
Qty: 112 TABLET | Refills: 0 | Status: SHIPPED | OUTPATIENT
Start: 2024-03-25

## 2024-03-25 RX ORDER — ATORVASTATIN CALCIUM 10 MG/1
TABLET, FILM COATED ORAL
Qty: 28 TABLET | Refills: 0 | Status: SHIPPED | OUTPATIENT
Start: 2024-03-25

## 2024-03-25 RX ORDER — FLUOXETINE HYDROCHLORIDE 20 MG/1
CAPSULE ORAL
Qty: 84 CAPSULE | Refills: 0 | Status: SHIPPED | OUTPATIENT
Start: 2024-03-25

## 2024-03-25 NOTE — TELEPHONE ENCOUNTER
Heidi Barrios is calling to request a refill on the following medication(s):    Last Visit Date (If Applicable):  8/10/2023    Next Visit Date:    Visit date not found    Medication Request:  Requested Prescriptions     Pending Prescriptions Disp Refills    metFORMIN (GLUCOPHAGE) 500 MG tablet [Pharmacy Med Name: METFORMIN  MG TABLET] 56 tablet 0     Sig: TAKE (1) TABLET EVERY MORNING AND BEDTIME    levothyroxine (SYNTHROID) 50 MCG tablet [Pharmacy Med Name: LEVOTHYROXINE 50 MCG TABLET] 112 tablet 0     Sig: TAKE (4) TABLETS EVERY MORNING    atorvastatin (LIPITOR) 10 MG tablet [Pharmacy Med Name: ATORVASTATIN 10 MG TABLET] 28 tablet 0     Sig: TAKE 1 TABLET AT BEDTIME    Prenatal Vit-Fe Fumarate-FA (WESTAB PLUS) 27-1 MG TABS [Pharmacy Med Name: WESTAB PLUS TABLET] 28 tablet 0     Sig: TAKE 1 TABLET AT BEDTIME    FLUoxetine (PROZAC) 20 MG capsule [Pharmacy Med Name: FLUOXETINE HCL 20 MG CAPSULE] 84 capsule 0     Sig: TAKE (3) CAPSULES EVERY MORNING    vitamin D (ERGOCALCIFEROL) 1.25 MG (62242 UT) CAPS capsule [Pharmacy Med Name: VITAMIN D2 1.25MG(50,000 UNIT)] 12 capsule 0     Sig: TAKE (1) CAPSULE ON MONDAY, WEDNESDAY & FRIDAY.

## 2024-04-01 NOTE — TELEPHONE ENCOUNTER
Yong Cobian is calling to request a refill on the following medication(s):    Last Visit Date (If Applicable):  8/87/6012    Next Visit Date:    9/21/2022    Medication Request:  Requested Prescriptions     Pending Prescriptions Disp Refills    ALPRAZolam (XANAX) 1 MG tablet [Pharmacy Med Name: ALPRAZOLAM 1 MG TABLET] 30 tablet      Sig: take 1 tablet by mouth once daily
conjunctiva clear/non icteric sclera

## 2024-04-10 ENCOUNTER — TELEPHONE (OUTPATIENT)
Dept: FAMILY MEDICINE CLINIC | Age: 54
End: 2024-04-10

## 2024-04-10 NOTE — TELEPHONE ENCOUNTER
Patient is wanting to know what the provider recommends in the mean time till her appointment on Monday. Please advise.

## 2024-04-10 NOTE — TELEPHONE ENCOUNTER
Patient calls in stating she's had a persistent headache for about 6 days now, she went to urgent care and they noted fluid in her ear. Sx have not gotten better. Right thumb is swollen and clicks as well. Patient asking what you advise.

## 2024-04-15 ENCOUNTER — OFFICE VISIT (OUTPATIENT)
Dept: FAMILY MEDICINE CLINIC | Age: 54
End: 2024-04-15
Payer: COMMERCIAL

## 2024-04-15 VITALS
SYSTOLIC BLOOD PRESSURE: 123 MMHG | WEIGHT: 236 LBS | OXYGEN SATURATION: 100 % | BODY MASS INDEX: 41.81 KG/M2 | DIASTOLIC BLOOD PRESSURE: 77 MMHG | HEART RATE: 79 BPM

## 2024-04-15 DIAGNOSIS — B02.31 HERPES ZOSTER CONJUNCTIVITIS: Primary | ICD-10-CM

## 2024-04-15 PROCEDURE — 99213 OFFICE O/P EST LOW 20 MIN: CPT | Performed by: FAMILY MEDICINE

## 2024-04-15 RX ORDER — VALACYCLOVIR HYDROCHLORIDE 1 G/1
1000 TABLET, FILM COATED ORAL 3 TIMES DAILY
Qty: 21 TABLET | Refills: 0 | Status: SHIPPED | OUTPATIENT
Start: 2024-04-15 | End: 2024-04-22

## 2024-04-15 NOTE — PROGRESS NOTES
[facility]  [facility] MEDICINE  [address]  [address]  [facility] [address]  Dept: [phone]      Heidi Barrios is a 53 y.o. female who presents today for follow up on her  medical conditions as noted below.      Chief Complaint   Patient presents with    Mass     Lump behind rt ear  little bumps on the otp of pt hear rt side  eye lid has two spots     Otalgia     Ear pain     Headache     Was treated in er        Patient Active Problem List:     Anxiety     Morbid obesity (HCC)     History reviewed. No pertinent past medical history.   No past surgical history on file.  No family history on file.    Current Outpatient Medications   Medication Sig Dispense Refill    valACYclovir (VALTREX) 1 g tablet Take 1 tablet by mouth 3 times daily for 7 days 21 tablet 0    metFORMIN (GLUCOPHAGE) 500 MG tablet TAKE (1) TABLET EVERY MORNING AND BEDTIME 56 tablet 0    levothyroxine (SYNTHROID) 50 MCG tablet TAKE (4) TABLETS EVERY MORNING 112 tablet 0    atorvastatin (LIPITOR) 10 MG tablet TAKE 1 TABLET AT BEDTIME 28 tablet 0    Prenatal Vit-Fe Fumarate-FA (WESTAB PLUS) 27-1 MG TABS TAKE 1 TABLET AT BEDTIME 28 tablet 0    FLUoxetine (PROZAC) 20 MG capsule TAKE (3) CAPSULES EVERY MORNING 84 capsule 0    vitamin D (ERGOCALCIFEROL) 1.25 MG (77381 UT) CAPS capsule TAKE (1) CAPSULE ON MONDAY, WEDNESDAY & FRIDAY. 12 capsule 0    traZODone (DESYREL) 50 MG tablet take 1 tablet by mouth nightly if needed for sleep 30 tablet 0    MOUNJARO 15 MG/0.5ML SOPN SC injection Inject 0.5 mLs into the skin once a week 12 Adjustable Dose Pre-filled Pen Syringe 3    MOUNJARO 12.5 MG/0.5ML SOPN SC injection INJECT 0.5 mL INTO THE SKIN ONCE A WEEK 2 mL 0    MOUNJARO 12.5 MG/0.5ML SOPN SC injection INJECT 0.5 mL INTO THE SKIN ONCE A WEEK 2 mL 0    ondansetron (ZOFRAN) 8 MG tablet Take 1 tablet by mouth every 8 hours as needed for Nausea or Vomiting 30 tablet 0    famotidine (PEPCID) 20 MG tablet Take 1 tablet by

## 2024-04-22 DIAGNOSIS — F41.9 ANXIETY: ICD-10-CM

## 2024-04-22 RX ORDER — TRAZODONE HYDROCHLORIDE 50 MG/1
50 TABLET ORAL NIGHTLY
Qty: 30 TABLET | Refills: 0 | Status: SHIPPED | OUTPATIENT
Start: 2024-04-22

## 2024-04-26 RX ORDER — FLUOXETINE HYDROCHLORIDE 20 MG/1
CAPSULE ORAL
Qty: 84 CAPSULE | Refills: 0 | Status: SHIPPED | OUTPATIENT
Start: 2024-04-26

## 2024-04-26 RX ORDER — PNV,CALCIUM 72/IRON/FOLIC ACID 27 MG-1 MG
TABLET ORAL
Qty: 28 TABLET | Refills: 0 | Status: SHIPPED | OUTPATIENT
Start: 2024-04-26

## 2024-04-26 RX ORDER — ATORVASTATIN CALCIUM 10 MG/1
TABLET, FILM COATED ORAL
Qty: 28 TABLET | Refills: 0 | Status: SHIPPED | OUTPATIENT
Start: 2024-04-26

## 2024-04-26 RX ORDER — ERGOCALCIFEROL 1.25 MG/1
CAPSULE ORAL
Qty: 12 CAPSULE | Refills: 0 | Status: SHIPPED | OUTPATIENT
Start: 2024-04-26

## 2024-04-26 RX ORDER — LEVOTHYROXINE SODIUM 0.05 MG/1
TABLET ORAL
Qty: 112 TABLET | Refills: 0 | Status: SHIPPED | OUTPATIENT
Start: 2024-04-26

## 2024-04-26 NOTE — TELEPHONE ENCOUNTER
Heidi Barrios is calling to request a refill on the following medication(s):    Medication Request:  Requested Prescriptions     Pending Prescriptions Disp Refills    vitamin D (ERGOCALCIFEROL) 1.25 MG (23912 UT) CAPS capsule [Pharmacy Med Name: VITAMIN D2 1.25MG(50,000 UNIT)] 12 capsule 0     Sig: TAKE (1) CAPSULE ON MONDAY, WEDNESDAY & FRIDAY.    FLUoxetine (PROZAC) 20 MG capsule [Pharmacy Med Name: FLUOXETINE HCL 20 MG CAPSULE] 84 capsule 0     Sig: TAKE (3) CAPSULES EVERY MORNING    Prenatal Vit-Fe Fumarate-FA (WESTAB PLUS) 27-1 MG TABS [Pharmacy Med Name: WESTAB PLUS TABLET] 28 tablet 0     Sig: TAKE 1 TABLET AT BEDTIME    atorvastatin (LIPITOR) 10 MG tablet [Pharmacy Med Name: ATORVASTATIN 10 MG TABLET] 28 tablet 0     Sig: TAKE 1 TABLET AT BEDTIME    levothyroxine (SYNTHROID) 50 MCG tablet [Pharmacy Med Name: LEVOTHYROXINE 50 MCG TABLET] 112 tablet 0     Sig: TAKE (4) TABLETS EVERY MORNING    metFORMIN (GLUCOPHAGE) 500 MG tablet [Pharmacy Med Name: METFORMIN  MG TABLET] 56 tablet 0     Sig: TAKE (1) TABLET EVERY MORNING AND BEDTIME       Last Visit Date (If Applicable):  4/15/2024    Next Visit Date:    Visit date not found

## 2024-05-22 RX ORDER — FLUOXETINE HYDROCHLORIDE 20 MG/1
CAPSULE ORAL
Qty: 84 CAPSULE | Refills: 0 | Status: SHIPPED | OUTPATIENT
Start: 2024-05-22

## 2024-05-22 RX ORDER — ATORVASTATIN CALCIUM 10 MG/1
TABLET, FILM COATED ORAL
Qty: 28 TABLET | Refills: 0 | Status: SHIPPED | OUTPATIENT
Start: 2024-05-22

## 2024-05-22 RX ORDER — LEVOTHYROXINE SODIUM 0.05 MG/1
TABLET ORAL
Qty: 112 TABLET | Refills: 0 | Status: SHIPPED | OUTPATIENT
Start: 2024-05-22

## 2024-05-22 RX ORDER — PNV,CALCIUM 72/IRON/FOLIC ACID 27 MG-1 MG
TABLET ORAL
Qty: 28 TABLET | Refills: 0 | Status: SHIPPED | OUTPATIENT
Start: 2024-05-22

## 2024-05-22 RX ORDER — ERGOCALCIFEROL 1.25 MG/1
CAPSULE ORAL
Qty: 12 CAPSULE | Refills: 0 | Status: SHIPPED | OUTPATIENT
Start: 2024-05-22

## 2024-05-29 DIAGNOSIS — F41.9 ANXIETY: ICD-10-CM

## 2024-05-29 RX ORDER — ALPRAZOLAM 1 MG/1
TABLET ORAL
Qty: 30 TABLET | Refills: 0 | Status: SHIPPED | OUTPATIENT
Start: 2024-05-29 | End: 2024-06-28

## 2024-05-29 RX ORDER — TRAZODONE HYDROCHLORIDE 50 MG/1
50 TABLET ORAL NIGHTLY PRN
Qty: 30 TABLET | Refills: 0 | Status: SHIPPED | OUTPATIENT
Start: 2024-05-29

## 2024-05-29 NOTE — TELEPHONE ENCOUNTER
Heidi Barrios is calling to request a refill on the following medication(s):    Last Visit Date (If Applicable):  4/15/2024    Next Visit Date:    Visit date not found    Medication Request:  Requested Prescriptions     Pending Prescriptions Disp Refills    traZODone (DESYREL) 50 MG tablet [Pharmacy Med Name: TRAZODONE 50 MG TABLET] 30 tablet 0     Sig: take 1 tablet by mouth at bedtime if needed for sleep

## 2024-06-19 RX ORDER — LEVOTHYROXINE SODIUM 0.05 MG/1
TABLET ORAL
Qty: 112 TABLET | Refills: 0 | Status: SHIPPED | OUTPATIENT
Start: 2024-06-19

## 2024-06-19 RX ORDER — ERGOCALCIFEROL 1.25 MG/1
CAPSULE ORAL
Qty: 12 CAPSULE | Refills: 0 | Status: SHIPPED | OUTPATIENT
Start: 2024-06-19

## 2024-06-19 RX ORDER — FLUOXETINE HYDROCHLORIDE 20 MG/1
CAPSULE ORAL
Qty: 84 CAPSULE | Refills: 0 | Status: SHIPPED | OUTPATIENT
Start: 2024-06-19

## 2024-06-19 RX ORDER — PNV,CALCIUM 72/IRON/FOLIC ACID 27 MG-1 MG
TABLET ORAL
Qty: 28 TABLET | Refills: 0 | Status: SHIPPED | OUTPATIENT
Start: 2024-06-19

## 2024-06-19 RX ORDER — ATORVASTATIN CALCIUM 10 MG/1
TABLET, FILM COATED ORAL
Qty: 28 TABLET | Refills: 0 | Status: SHIPPED | OUTPATIENT
Start: 2024-06-19

## 2024-06-19 NOTE — TELEPHONE ENCOUNTER
Heidi Barrios is calling to request a refill on the following medication(s):    Last Visit Date (If Applicable):  4/15/2024    Next Visit Date:    Visit date not found    Medication Request:  Requested Prescriptions     Pending Prescriptions Disp Refills    vitamin D (ERGOCALCIFEROL) 1.25 MG (26045 UT) CAPS capsule [Pharmacy Med Name: VITAMIN D2 1.25MG(50,000 UNIT)] 12 capsule 0     Sig: TAKE (1) CAPSULE ON MONDAY, WEDNESDAY & FRIDAY.    FLUoxetine (PROZAC) 20 MG capsule [Pharmacy Med Name: FLUOXETINE HCL 20 MG CAPSULE] 84 capsule 0     Sig: TAKE (3) CAPSULES EVERY MORNING    Prenatal Vit-Fe Fumarate-FA (WESTAB PLUS) 27-1 MG TABS [Pharmacy Med Name: WESTAB PLUS TABLET] 28 tablet 0     Sig: TAKE 1 TABLET AT BEDTIME    atorvastatin (LIPITOR) 10 MG tablet [Pharmacy Med Name: ATORVASTATIN 10 MG TABLET] 28 tablet 0     Sig: TAKE 1 TABLET AT BEDTIME    levothyroxine (SYNTHROID) 50 MCG tablet [Pharmacy Med Name: LEVOTHYROXINE 50 MCG TABLET] 112 tablet 0     Sig: TAKE (4) TABLETS EVERY MORNING    metFORMIN (GLUCOPHAGE) 500 MG tablet [Pharmacy Med Name: METFORMIN  MG TABLET] 56 tablet 0     Sig: TAKE (1) TABLET EVERY MORNING AND BEDTIME

## 2024-07-18 RX ORDER — PNV,CALCIUM 72/IRON/FOLIC ACID 27 MG-1 MG
TABLET ORAL
Qty: 28 TABLET | Refills: 0 | Status: SHIPPED | OUTPATIENT
Start: 2024-07-18

## 2024-07-18 RX ORDER — LEVOTHYROXINE SODIUM 0.05 MG/1
TABLET ORAL
Qty: 112 TABLET | Refills: 0 | Status: SHIPPED | OUTPATIENT
Start: 2024-07-18

## 2024-07-18 RX ORDER — ERGOCALCIFEROL 1.25 MG/1
CAPSULE ORAL
Qty: 12 CAPSULE | Refills: 0 | Status: SHIPPED | OUTPATIENT
Start: 2024-07-18

## 2024-07-18 RX ORDER — FLUOXETINE HYDROCHLORIDE 20 MG/1
CAPSULE ORAL
Qty: 84 CAPSULE | Refills: 0 | Status: SHIPPED | OUTPATIENT
Start: 2024-07-18

## 2024-07-18 RX ORDER — ATORVASTATIN CALCIUM 10 MG/1
TABLET, FILM COATED ORAL
Qty: 28 TABLET | Refills: 0 | Status: SHIPPED | OUTPATIENT
Start: 2024-07-18

## 2024-07-18 NOTE — TELEPHONE ENCOUNTER
Heidi Barrios is calling to request a refill on the following medication(s):    Last Visit Date (If Applicable):  4/15/2024    Next Visit Date:    Visit date not found    Medication Request:  Requested Prescriptions     Pending Prescriptions Disp Refills    metFORMIN (GLUCOPHAGE) 500 MG tablet [Pharmacy Med Name: METFORMIN  MG TABLET] 56 tablet 0     Sig: TAKE (1) TABLET EVERY MORNING AND BEDTIME    levothyroxine (SYNTHROID) 50 MCG tablet [Pharmacy Med Name: LEVOTHYROXINE 50 MCG TABLET] 112 tablet 0     Sig: TAKE (4) TABLETS EVERY MORNING    atorvastatin (LIPITOR) 10 MG tablet [Pharmacy Med Name: ATORVASTATIN 10 MG TABLET] 28 tablet 0     Sig: TAKE 1 TABLET AT BEDTIME    Prenatal Vit-Fe Fumarate-FA (WESTAB PLUS) 27-1 MG TABS [Pharmacy Med Name: WESTAB PLUS TABLET] 28 tablet 0     Sig: TAKE 1 TABLET AT BEDTIME    FLUoxetine (PROZAC) 20 MG capsule [Pharmacy Med Name: FLUOXETINE HCL 20 MG CAPSULE] 84 capsule 0     Sig: TAKE (3) CAPSULES EVERY MORNING    vitamin D (ERGOCALCIFEROL) 1.25 MG (84051 UT) CAPS capsule [Pharmacy Med Name: VITAMIN D2 1.25MG(50,000 UNIT)] 12 capsule 0     Sig: TAKE (1) CAPSULE ON MONDAY, WEDNESDAY & FRIDAY.

## 2024-08-05 ENCOUNTER — PATIENT MESSAGE (OUTPATIENT)
Dept: FAMILY MEDICINE CLINIC | Age: 54
End: 2024-08-05

## 2024-08-05 DIAGNOSIS — F41.9 ANXIETY: ICD-10-CM

## 2024-08-07 RX ORDER — CYANOCOBALAMIN 500 UG/1
SPRAY NASAL
Qty: 3 EACH | Refills: 3 | Status: SHIPPED | OUTPATIENT
Start: 2024-08-07

## 2024-08-07 RX ORDER — TRAZODONE HYDROCHLORIDE 50 MG/1
50 TABLET ORAL NIGHTLY PRN
Qty: 90 TABLET | Refills: 3 | Status: SHIPPED | OUTPATIENT
Start: 2024-08-07

## 2024-08-07 NOTE — TELEPHONE ENCOUNTER
From: Heidi Barrios  To: Dr. Camelia Cagle  Sent: 8/5/2024 4:24 PM EDT  Subject: Prescription refills    Hi! My pharmacy went out of business and I need some medication refills sent to the [facility] [place].    I need the Trazadone refilled and the NASCOBAL 500 mcg/spray spray,non-aerosol : Generic name: cyanocobalamin (vitamin B-12 ) refilled and sent to [facility].    Thank you!

## 2024-08-08 RX ORDER — ERGOCALCIFEROL 1.25 MG/1
CAPSULE ORAL
Qty: 12 CAPSULE | Refills: 0 | Status: SHIPPED | OUTPATIENT
Start: 2024-08-08

## 2024-08-08 RX ORDER — PNV,CALCIUM 72/IRON/FOLIC ACID 27 MG-1 MG
TABLET ORAL
Qty: 28 TABLET | Refills: 0 | Status: SHIPPED | OUTPATIENT
Start: 2024-08-08

## 2024-08-08 RX ORDER — ATORVASTATIN CALCIUM 10 MG/1
TABLET, FILM COATED ORAL
Qty: 28 TABLET | Refills: 0 | Status: SHIPPED | OUTPATIENT
Start: 2024-08-08

## 2024-08-08 RX ORDER — LEVOTHYROXINE SODIUM 0.05 MG/1
TABLET ORAL
Qty: 112 TABLET | Refills: 0 | Status: SHIPPED | OUTPATIENT
Start: 2024-08-08

## 2024-08-08 RX ORDER — FLUOXETINE HYDROCHLORIDE 20 MG/1
CAPSULE ORAL
Qty: 84 CAPSULE | Refills: 0 | Status: SHIPPED | OUTPATIENT
Start: 2024-08-08

## 2024-09-05 RX ORDER — ERGOCALCIFEROL 1.25 MG/1
CAPSULE, LIQUID FILLED ORAL
Qty: 12 CAPSULE | Refills: 0 | Status: SHIPPED | OUTPATIENT
Start: 2024-09-05

## 2024-09-05 RX ORDER — PNV,CALCIUM 72/IRON/FOLIC ACID 27 MG-1 MG
TABLET ORAL
Qty: 28 TABLET | Refills: 0 | Status: SHIPPED | OUTPATIENT
Start: 2024-09-05

## 2024-09-05 RX ORDER — LEVOTHYROXINE SODIUM 50 UG/1
TABLET ORAL
Qty: 112 TABLET | Refills: 0 | Status: SHIPPED | OUTPATIENT
Start: 2024-09-05

## 2024-09-05 RX ORDER — ATORVASTATIN CALCIUM 10 MG/1
TABLET, FILM COATED ORAL
Qty: 28 TABLET | Refills: 0 | Status: SHIPPED | OUTPATIENT
Start: 2024-09-05

## 2024-09-05 NOTE — TELEPHONE ENCOUNTER
Heidi Barrios is calling to request a refill on the following medication(s):    Last Visit Date (If Applicable):  4/15/2024    Next Visit Date:    Visit date not found    Medication Request:  Requested Prescriptions     Pending Prescriptions Disp Refills    Prenatal Vit-Fe Fumarate-FA (WESTAB PLUS) 27-1 MG TABS [Pharmacy Med Name: WESTAB PLUS TABLET] 28 tablet 0     Sig: TAKE 1 TABLET AT BEDTIME    vitamin D (ERGOCALCIFEROL) 1.25 MG (75356 UT) CAPS capsule [Pharmacy Med Name: VITAMIN D2 1.25MG(50,000 UNIT)] 12 capsule 0     Sig: TAKE (1) CAPSULE ON MONDAY, WEDNESDAY & FRIDAY.    FLUoxetine (PROZAC) 20 MG capsule [Pharmacy Med Name: FLUOXETINE HCL 20 MG CAPSULE] 84 capsule 0     Sig: TAKE (3) CAPSULES EVERY MORNING    atorvastatin (LIPITOR) 10 MG tablet [Pharmacy Med Name: ATORVASTATIN 10 MG TABLET] 28 tablet 0     Sig: TAKE 1 TABLET AT BEDTIME    levothyroxine (SYNTHROID) 50 MCG tablet [Pharmacy Med Name: LEVOTHYROXINE 50 MCG TABLET] 112 tablet 0     Sig: TAKE (4) TABLETS EVERY MORNING    metFORMIN (GLUCOPHAGE) 500 MG tablet [Pharmacy Med Name: METFORMIN  MG TABLET] 56 tablet 0     Sig: TAKE (1) TABLET EVERY MORNING AND BEDTIME

## 2024-10-03 RX ORDER — ATORVASTATIN CALCIUM 10 MG/1
TABLET, FILM COATED ORAL
Qty: 28 TABLET | Refills: 0 | Status: SHIPPED | OUTPATIENT
Start: 2024-10-03

## 2024-10-03 RX ORDER — PNV,CALCIUM 72/IRON/FOLIC ACID 27 MG-1 MG
TABLET ORAL
Qty: 28 TABLET | Refills: 0 | Status: SHIPPED | OUTPATIENT
Start: 2024-10-03

## 2024-10-03 RX ORDER — LEVOTHYROXINE SODIUM 50 UG/1
TABLET ORAL
Qty: 112 TABLET | Refills: 0 | Status: SHIPPED | OUTPATIENT
Start: 2024-10-03

## 2024-10-03 RX ORDER — ERGOCALCIFEROL 1.25 MG/1
CAPSULE, LIQUID FILLED ORAL
Qty: 12 CAPSULE | Refills: 0 | Status: SHIPPED | OUTPATIENT
Start: 2024-10-03

## 2024-10-03 NOTE — TELEPHONE ENCOUNTER
Heidi Barrios is calling to request a refill on the following medication(s):    Last Visit Date (If Applicable):  4/15/2024    Next Visit Date:    Visit date not found    Medication Request:  Requested Prescriptions     Pending Prescriptions Disp Refills    Prenatal Vit-Fe Fumarate-FA (WESTAB PLUS) 27-1 MG TABS [Pharmacy Med Name: WESTAB PLUS TABLET] 28 tablet 0     Sig: TAKE 1 TABLET AT BEDTIME    vitamin D (ERGOCALCIFEROL) 1.25 MG (45633 UT) CAPS capsule [Pharmacy Med Name: VITAMIN D2 1.25MG(50,000 UNIT)] 12 capsule 0     Sig: TAKE (1) CAPSULE ON MONDAY, WEDNESDAY & FRIDAY.    FLUoxetine (PROZAC) 20 MG capsule [Pharmacy Med Name: FLUOXETINE HCL 20 MG CAPSULE] 84 capsule 0     Sig: TAKE (3) CAPSULES EVERY MORNING    atorvastatin (LIPITOR) 10 MG tablet [Pharmacy Med Name: ATORVASTATIN 10 MG TABLET] 28 tablet 0     Sig: TAKE 1 TABLET AT BEDTIME    levothyroxine (SYNTHROID) 50 MCG tablet [Pharmacy Med Name: LEVOTHYROXINE 50 MCG TABLET] 112 tablet 0     Sig: TAKE (4) TABLETS EVERY MORNING    metFORMIN (GLUCOPHAGE) 500 MG tablet [Pharmacy Med Name: METFORMIN  MG TABLET] 56 tablet 0     Sig: TAKE (1) TABLET EVERY MORNING AND BEDTIME

## 2024-10-13 SDOH — ECONOMIC STABILITY: INCOME INSECURITY: HOW HARD IS IT FOR YOU TO PAY FOR THE VERY BASICS LIKE FOOD, HOUSING, MEDICAL CARE, AND HEATING?: NOT HARD AT ALL

## 2024-10-13 SDOH — ECONOMIC STABILITY: FOOD INSECURITY: WITHIN THE PAST 12 MONTHS, THE FOOD YOU BOUGHT JUST DIDN'T LAST AND YOU DIDN'T HAVE MONEY TO GET MORE.: NEVER TRUE

## 2024-10-13 SDOH — ECONOMIC STABILITY: FOOD INSECURITY: WITHIN THE PAST 12 MONTHS, YOU WORRIED THAT YOUR FOOD WOULD RUN OUT BEFORE YOU GOT MONEY TO BUY MORE.: NEVER TRUE

## 2024-10-13 ASSESSMENT — PATIENT HEALTH QUESTIONNAIRE - PHQ9
2. FEELING DOWN, DEPRESSED OR HOPELESS: SEVERAL DAYS
2. FEELING DOWN, DEPRESSED OR HOPELESS: SEVERAL DAYS
SUM OF ALL RESPONSES TO PHQ QUESTIONS 1-9: 2
SUM OF ALL RESPONSES TO PHQ9 QUESTIONS 1 & 2: 2
1. LITTLE INTEREST OR PLEASURE IN DOING THINGS: SEVERAL DAYS
SUM OF ALL RESPONSES TO PHQ QUESTIONS 1-9: 2
SUM OF ALL RESPONSES TO PHQ9 QUESTIONS 1 & 2: 2
SUM OF ALL RESPONSES TO PHQ QUESTIONS 1-9: 2
1. LITTLE INTEREST OR PLEASURE IN DOING THINGS: SEVERAL DAYS
SUM OF ALL RESPONSES TO PHQ QUESTIONS 1-9: 2

## 2024-10-14 ENCOUNTER — OFFICE VISIT (OUTPATIENT)
Dept: FAMILY MEDICINE CLINIC | Age: 54
End: 2024-10-14
Payer: COMMERCIAL

## 2024-10-14 VITALS
WEIGHT: 245 LBS | SYSTOLIC BLOOD PRESSURE: 131 MMHG | HEIGHT: 63 IN | OXYGEN SATURATION: 98 % | HEART RATE: 75 BPM | BODY MASS INDEX: 43.41 KG/M2 | DIASTOLIC BLOOD PRESSURE: 68 MMHG

## 2024-10-14 DIAGNOSIS — Z13.1 SCREENING FOR DIABETES MELLITUS: ICD-10-CM

## 2024-10-14 DIAGNOSIS — M62.830 SPASM OF THORACIC BACK MUSCLE: ICD-10-CM

## 2024-10-14 DIAGNOSIS — Z00.00 WELL ADULT EXAM: Primary | ICD-10-CM

## 2024-10-14 LAB — HBA1C MFR BLD: 5.6 %

## 2024-10-14 PROCEDURE — 83036 HEMOGLOBIN GLYCOSYLATED A1C: CPT | Performed by: FAMILY MEDICINE

## 2024-10-14 PROCEDURE — 99396 PREV VISIT EST AGE 40-64: CPT | Performed by: FAMILY MEDICINE

## 2024-10-14 RX ORDER — CYCLOBENZAPRINE HCL 10 MG
10 TABLET ORAL NIGHTLY
Qty: 30 TABLET | Refills: 0 | Status: SHIPPED | OUTPATIENT
Start: 2024-10-14 | End: 2024-11-13

## 2024-10-14 NOTE — PROGRESS NOTES
membrane is not erythematous.  No middle ear effusion.   Nose: No mucosal edema.   Mouth/Throat: Oropharynx is clear and moist. No posterior oropharyngeal erythema.    Eyes: Conjunctivae and EOM are normal. Pupils are equal, round, and reactive to light.    Neck: Normal range of motion. Neck supple. No thyromegaly present.   Cardiovascular: Normal rate, regular rhythm and normal heart sounds.    No murmur heard.  Pulmonary/Chest: Effort normal and breath sounds normal. She has no wheezes. Shehas no rales.   Abdominal: Soft. Bowel sounds are normal. She exhibits no distension and no mass.  There is no tenderness. There is no rebound and no guarding.   Genitourinary/Anorectal:deferred  Musculoskeletal: Normal range of motion. She exhibits no edema or tenderness.   Lymphadenopathy: She has no cervical adenopathy.   Neurological: She is alert and oriented to person, place, and time. She has normal reflexes.   Skin: Skin is warm and dry. No rash noted.   Psychiatric: She has a normal mood and affect. Her   behavior is normal.       Assessment:      1. Well adult exam    2. Spasm of thoracic back muscle          Plan:      Call or return to clinic prn if these symptoms worsen or fail to improve as anticipated.  I have reviewed the instructions with the patient, answering all questions to her satisfaction.    No follow-ups on file.  Orders Placed This Encounter   Procedures    CBC with Auto Differential     Standing Status:   Future     Standing Expiration Date:   10/14/2025    Comprehensive Metabolic Panel     Standing Status:   Future     Standing Expiration Date:   10/14/2025    Lipid Panel     Standing Status:   Future     Standing Expiration Date:   10/14/2025     Order Specific Question:   Is Patient Fasting?/# of Hours     Answer:   Yes    T4, Free     Standing Status:   Future     Standing Expiration Date:   10/14/2025    TSH     Standing Status:   Future     Standing Expiration Date:   10/14/2025    Vitamin D 25

## 2024-10-31 RX ORDER — LEVOTHYROXINE SODIUM 50 UG/1
TABLET ORAL
Qty: 112 TABLET | Refills: 0 | Status: SHIPPED | OUTPATIENT
Start: 2024-10-31

## 2024-10-31 RX ORDER — ATORVASTATIN CALCIUM 10 MG/1
TABLET, FILM COATED ORAL
Qty: 28 TABLET | Refills: 0 | Status: SHIPPED | OUTPATIENT
Start: 2024-10-31

## 2024-10-31 RX ORDER — ERGOCALCIFEROL 1.25 MG/1
CAPSULE, LIQUID FILLED ORAL
Qty: 12 CAPSULE | Refills: 0 | Status: SHIPPED | OUTPATIENT
Start: 2024-10-31

## 2024-10-31 RX ORDER — PNV,CALCIUM 72/IRON/FOLIC ACID 27 MG-1 MG
TABLET ORAL
Qty: 28 TABLET | Refills: 0 | Status: SHIPPED | OUTPATIENT
Start: 2024-10-31

## 2024-10-31 NOTE — TELEPHONE ENCOUNTER
Heidi Barrios is calling to request a refill on the following medication(s):    Last Visit Date (If Applicable):  10/14/2024    Next Visit Date:    Visit date not found    Medication Request:  Requested Prescriptions     Pending Prescriptions Disp Refills    metFORMIN (GLUCOPHAGE) 500 MG tablet [Pharmacy Med Name: METFORMIN  MG TABLET] 56 tablet 0     Sig: TAKE (1) TABLET EVERY MORNING AND BEDTIME    levothyroxine (SYNTHROID) 50 MCG tablet [Pharmacy Med Name: LEVOTHYROXINE 50 MCG TABLET] 112 tablet 0     Sig: TAKE (4) TABLETS EVERY MORNING    atorvastatin (LIPITOR) 10 MG tablet [Pharmacy Med Name: ATORVASTATIN 10 MG TABLET] 28 tablet 0     Sig: TAKE 1 TABLET AT BEDTIME    FLUoxetine (PROZAC) 20 MG capsule [Pharmacy Med Name: FLUOXETINE HCL 20 MG CAPSULE] 84 capsule 0     Sig: TAKE (3) CAPSULES EVERY MORNING    vitamin D (ERGOCALCIFEROL) 1.25 MG (09638 UT) CAPS capsule [Pharmacy Med Name: VITAMIN D2 1.25MG(50,000 UNIT)] 12 capsule 0     Sig: TAKE (1) CAPSULE ON MONDAY, WEDNESDAY & FRIDAY.    Prenatal Vit-Fe Fumarate-FA (WESTAB PLUS) 27-1 MG TABS [Pharmacy Med Name: WESTAB PLUS TABLET] 28 tablet 0     Sig: TAKE 1 TABLET AT BEDTIME

## 2024-11-25 LAB
ALBUMIN: NORMAL
ALP BLD-CCNC: NORMAL U/L
ALT SERPL-CCNC: NORMAL U/L
ANION GAP SERPL CALCULATED.3IONS-SCNC: NORMAL MMOL/L
AST SERPL-CCNC: NORMAL U/L
BASOPHILS ABSOLUTE: NORMAL
BASOPHILS RELATIVE PERCENT: NORMAL
BILIRUB SERPL-MCNC: NORMAL MG/DL
BUN BLDV-MCNC: NORMAL MG/DL
CALCIUM SERPL-MCNC: NORMAL MG/DL
CHLORIDE BLD-SCNC: NORMAL MMOL/L
CHOLESTEROL, TOTAL: 156 MG/DL
CHOLESTEROL/HDL RATIO: 2.3
CO2: NORMAL
CREAT SERPL-MCNC: NORMAL MG/DL
EOSINOPHILS ABSOLUTE: NORMAL
EOSINOPHILS RELATIVE PERCENT: NORMAL
GFR, ESTIMATED: NORMAL
GLUCOSE BLD-MCNC: NORMAL MG/DL
HCT VFR BLD CALC: NORMAL %
HDLC SERPL-MCNC: 68 MG/DL (ref 35–70)
HEMOGLOBIN: NORMAL
LDL CHOLESTEROL: 59
LYMPHOCYTES ABSOLUTE: NORMAL
LYMPHOCYTES RELATIVE PERCENT: NORMAL
MCH RBC QN AUTO: NORMAL PG
MCHC RBC AUTO-ENTMCNC: NORMAL G/DL
MCV RBC AUTO: NORMAL FL
MONOCYTES ABSOLUTE: NORMAL
MONOCYTES RELATIVE PERCENT: NORMAL
NEUTROPHILS ABSOLUTE: NORMAL
NEUTROPHILS RELATIVE PERCENT: NORMAL
NONHDLC SERPL-MCNC: NORMAL MG/DL
PDW BLD-RTO: NORMAL %
PLATELET # BLD: NORMAL 10*3/UL
PMV BLD AUTO: NORMAL FL
POTASSIUM SERPL-SCNC: NORMAL MMOL/L
RBC # BLD: NORMAL 10*6/UL
SODIUM BLD-SCNC: NORMAL MMOL/L
T4 FREE: NORMAL
TOTAL PROTEIN: NORMAL
TRIGL SERPL-MCNC: 144 MG/DL
TSH SERPL DL<=0.05 MIU/L-ACNC: NORMAL M[IU]/L
VITAMIN D 25-HYDROXY: NORMAL
VITAMIN D2, 25 HYDROXY: NORMAL
VITAMIN D3,25 HYDROXY: NORMAL
VLDLC SERPL CALC-MCNC: 29 MG/DL
WBC # BLD: NORMAL 10*3/UL

## 2024-11-27 DIAGNOSIS — F41.9 ANXIETY: Primary | ICD-10-CM

## 2024-11-27 RX ORDER — ERGOCALCIFEROL 1.25 MG/1
CAPSULE, LIQUID FILLED ORAL
Qty: 12 CAPSULE | Refills: 5 | Status: SHIPPED | OUTPATIENT
Start: 2024-11-27

## 2024-11-27 RX ORDER — ALPRAZOLAM 0.5 MG
0.5 TABLET ORAL 3 TIMES DAILY PRN
Qty: 15 TABLET | Refills: 0 | Status: SHIPPED | OUTPATIENT
Start: 2024-11-27 | End: 2024-12-02

## 2024-11-27 RX ORDER — ATORVASTATIN CALCIUM 10 MG/1
TABLET, FILM COATED ORAL
Qty: 28 TABLET | Refills: 5 | Status: SHIPPED | OUTPATIENT
Start: 2024-11-27

## 2024-11-27 RX ORDER — LEVOTHYROXINE SODIUM 50 UG/1
TABLET ORAL
Qty: 112 TABLET | Refills: 5 | Status: SHIPPED | OUTPATIENT
Start: 2024-11-27

## 2024-11-27 RX ORDER — PNV,CALCIUM 72/IRON/FOLIC ACID 27 MG-1 MG
TABLET ORAL
Qty: 28 TABLET | Refills: 5 | Status: SHIPPED | OUTPATIENT
Start: 2024-11-27

## 2024-12-02 DIAGNOSIS — Z00.00 WELL ADULT EXAM: ICD-10-CM

## 2025-02-03 ENCOUNTER — OFFICE VISIT (OUTPATIENT)
Dept: FAMILY MEDICINE CLINIC | Age: 55
End: 2025-02-03

## 2025-02-03 VITALS
HEART RATE: 77 BPM | BODY MASS INDEX: 48.37 KG/M2 | HEIGHT: 63 IN | WEIGHT: 273 LBS | DIASTOLIC BLOOD PRESSURE: 76 MMHG | SYSTOLIC BLOOD PRESSURE: 144 MMHG | OXYGEN SATURATION: 98 %

## 2025-02-03 DIAGNOSIS — M79.642 HAND PAIN, LEFT: ICD-10-CM

## 2025-02-03 DIAGNOSIS — E11.69 TYPE 2 DIABETES MELLITUS WITH OTHER SPECIFIED COMPLICATION, WITHOUT LONG-TERM CURRENT USE OF INSULIN (HCC): Primary | ICD-10-CM

## 2025-02-03 DIAGNOSIS — E03.9 ACQUIRED HYPOTHYROIDISM: ICD-10-CM

## 2025-02-03 DIAGNOSIS — E66.813 CLASS 3 SEVERE OBESITY DUE TO EXCESS CALORIES WITH SERIOUS COMORBIDITY AND BODY MASS INDEX (BMI) OF 50.0 TO 59.9 IN ADULT: ICD-10-CM

## 2025-02-03 DIAGNOSIS — E66.01 CLASS 3 SEVERE OBESITY DUE TO EXCESS CALORIES WITH SERIOUS COMORBIDITY AND BODY MASS INDEX (BMI) OF 50.0 TO 59.9 IN ADULT: ICD-10-CM

## 2025-02-03 DIAGNOSIS — F51.01 PRIMARY INSOMNIA: ICD-10-CM

## 2025-02-03 LAB — HBA1C MFR BLD: 5.8 %

## 2025-02-03 RX ORDER — ARIPIPRAZOLE 2 MG/1
2 TABLET ORAL DAILY
Qty: 30 TABLET | Refills: 5 | Status: SHIPPED | OUTPATIENT
Start: 2025-02-03 | End: 2025-03-05

## 2025-02-03 RX ORDER — TIRZEPATIDE 7.5 MG/.5ML
7.5 INJECTION, SOLUTION SUBCUTANEOUS WEEKLY
Qty: 2 ML | Refills: 0 | Status: SHIPPED | OUTPATIENT
Start: 2025-02-03 | End: 2025-03-05

## 2025-02-03 RX ORDER — PREDNISONE 20 MG/1
TABLET ORAL
Qty: 12 TABLET | Refills: 0 | Status: SHIPPED | OUTPATIENT
Start: 2025-02-03

## 2025-02-03 SDOH — ECONOMIC STABILITY: FOOD INSECURITY: WITHIN THE PAST 12 MONTHS, THE FOOD YOU BOUGHT JUST DIDN'T LAST AND YOU DIDN'T HAVE MONEY TO GET MORE.: NEVER TRUE

## 2025-02-03 SDOH — ECONOMIC STABILITY: FOOD INSECURITY: WITHIN THE PAST 12 MONTHS, YOU WORRIED THAT YOUR FOOD WOULD RUN OUT BEFORE YOU GOT MONEY TO BUY MORE.: NEVER TRUE

## 2025-02-03 SDOH — ECONOMIC STABILITY: INCOME INSECURITY: IN THE LAST 12 MONTHS, WAS THERE A TIME WHEN YOU WERE NOT ABLE TO PAY THE MORTGAGE OR RENT ON TIME?: NO

## 2025-02-03 SDOH — ECONOMIC STABILITY: TRANSPORTATION INSECURITY
IN THE PAST 12 MONTHS, HAS THE LACK OF TRANSPORTATION KEPT YOU FROM MEDICAL APPOINTMENTS OR FROM GETTING MEDICATIONS?: NO

## 2025-02-03 ASSESSMENT — PATIENT HEALTH QUESTIONNAIRE - PHQ9
1. LITTLE INTEREST OR PLEASURE IN DOING THINGS: SEVERAL DAYS
SUM OF ALL RESPONSES TO PHQ QUESTIONS 1-9: 2
SUM OF ALL RESPONSES TO PHQ9 QUESTIONS 1 & 2: 2
2. FEELING DOWN, DEPRESSED OR HOPELESS: SEVERAL DAYS
SUM OF ALL RESPONSES TO PHQ QUESTIONS 1-9: 2
SUM OF ALL RESPONSES TO PHQ QUESTIONS 1-9: 2
1. LITTLE INTEREST OR PLEASURE IN DOING THINGS: SEVERAL DAYS
SUM OF ALL RESPONSES TO PHQ9 QUESTIONS 1 & 2: 2
2. FEELING DOWN, DEPRESSED OR HOPELESS: SEVERAL DAYS
SUM OF ALL RESPONSES TO PHQ QUESTIONS 1-9: 2

## 2025-02-03 NOTE — PROGRESS NOTES
[facility]  [facility] MEDICINE  [address]  [address]  [facility] [address]  Dept: [phone]      Heidi Barrios is a 54 y.o. female who presents today for follow up on her  medical conditions as noted below.      Chief Complaint   Patient presents with    Hand Pain     Left     Weight Loss    ADD     She wants to try and get tested for it        Patient Active Problem List:     Anxiety     Morbid obesity     No past medical history on file.   No past surgical history on file.  No family history on file.    Current Outpatient Medications   Medication Sig Dispense Refill    MOUNJARO 7.5 MG/0.5ML SOAJ Inject 7.5 mg into the skin once a week 2 mL 0    predniSONE (DELTASONE) 20 MG tablet 1 p.o. T.i.d. x2 days, 1 p.o. B.i.d. x2 days, 1 p.o. Q. Day x2 days dispense quantity suff 12 tablet 0    ARIPiprazole (ABILIFY) 2 MG tablet Take 1 tablet by mouth daily 30 tablet 5    Prenatal Vit-Fe Fumarate-FA (WESTAB PLUS) 27-1 MG TABS TAKE 1 TABLET AT BEDTIME 28 tablet 5    FLUoxetine (PROZAC) 20 MG capsule TAKE (3) CAPSULES EVERY MORNING 84 capsule 5    atorvastatin (LIPITOR) 10 MG tablet TAKE 1 TABLET AT BEDTIME 28 tablet 5    levothyroxine (SYNTHROID) 50 MCG tablet TAKE (4) TABLETS EVERY MORNING 112 tablet 5    metFORMIN (GLUCOPHAGE) 500 MG tablet TAKE (1) TABLET EVERY MORNING AND BEDTIME 56 tablet 5    NASCOBAL 500 MCG/0.1ML SOLN nasal spray instill 1 spray into each nostril every week 3 each 3    traZODone (DESYREL) 50 MG tablet Take 1 tablet by mouth nightly as needed for Sleep 90 tablet 3    ondansetron (ZOFRAN) 8 MG tablet Take 1 tablet by mouth every 8 hours as needed for Nausea or Vomiting 30 tablet 0    famotidine (PEPCID) 20 MG tablet Take 1 tablet by mouth 2 times daily 180 tablet 3    scopolamine (TRANSDERM-SCOP, 1.5 MG,) transdermal patch Place 1 patch onto the skin every 72 hours 5 patch 1    Cyanocobalamin (NASCOBAL) 500 MCG/0.1ML SOLN nasal spray 1 spray by Nasal route

## 2025-02-12 ENCOUNTER — TELEPHONE (OUTPATIENT)
Dept: FAMILY MEDICINE CLINIC | Age: 55
End: 2025-02-12

## 2025-02-12 NOTE — TELEPHONE ENCOUNTER
Patient called is going back on mounjaro 7.5mg, she has a 10mg pen and is asking if its okay that she take that to start. Please advise.

## 2025-03-04 RX ORDER — ARIPIPRAZOLE 5 MG/1
5 TABLET ORAL DAILY
Qty: 30 TABLET | Refills: 3 | Status: SHIPPED | OUTPATIENT
Start: 2025-03-04

## 2025-03-04 RX ORDER — TIRZEPATIDE 12.5 MG/.5ML
12.5 INJECTION, SOLUTION SUBCUTANEOUS WEEKLY
Qty: 2 ML | Refills: 0 | Status: SHIPPED | OUTPATIENT
Start: 2025-03-04

## 2025-03-05 ENCOUNTER — NURSE ONLY (OUTPATIENT)
Dept: FAMILY MEDICINE CLINIC | Age: 55
End: 2025-03-05
Payer: COMMERCIAL

## 2025-03-05 VITALS
OXYGEN SATURATION: 97 % | HEART RATE: 66 BPM | DIASTOLIC BLOOD PRESSURE: 80 MMHG | BODY MASS INDEX: 47.47 KG/M2 | WEIGHT: 268 LBS | SYSTOLIC BLOOD PRESSURE: 124 MMHG

## 2025-03-05 DIAGNOSIS — E66.01 CLASS 3 SEVERE OBESITY DUE TO EXCESS CALORIES WITH SERIOUS COMORBIDITY AND BODY MASS INDEX (BMI) OF 50.0 TO 59.9 IN ADULT: ICD-10-CM

## 2025-03-05 DIAGNOSIS — E66.813 CLASS 3 SEVERE OBESITY DUE TO EXCESS CALORIES WITH SERIOUS COMORBIDITY AND BODY MASS INDEX (BMI) OF 50.0 TO 59.9 IN ADULT: ICD-10-CM

## 2025-03-05 DIAGNOSIS — N39.0 URINARY TRACT INFECTION WITHOUT HEMATURIA, SITE UNSPECIFIED: Primary | ICD-10-CM

## 2025-03-05 LAB
BILIRUBIN, POC: ABNORMAL
BLOOD URINE, POC: ABNORMAL
CLARITY, POC: ABNORMAL
COLOR, POC: ABNORMAL
GLUCOSE URINE, POC: ABNORMAL MG/DL
KETONES, POC: ABNORMAL MG/DL
LEUKOCYTE EST, POC: ABNORMAL
NITRITE, POC: ABNORMAL
PH, POC: 5.5
PROTEIN, POC: 30 MG/DL
SPECIFIC GRAVITY, POC: 1.03
UROBILINOGEN, POC: 0.2 MG/DL

## 2025-03-05 PROCEDURE — 81003 URINALYSIS AUTO W/O SCOPE: CPT | Performed by: FAMILY MEDICINE

## 2025-03-05 RX ORDER — SEMAGLUTIDE 0.25 MG/.5ML
0.25 INJECTION, SOLUTION SUBCUTANEOUS
Qty: 2 ML | Refills: 1 | COMMUNITY
Start: 2025-03-05 | End: 2025-03-05

## 2025-03-05 NOTE — PROGRESS NOTES
Patient came in with uti sx , dropped off a urine sample is requesting something to called in before culture is back. .

## 2025-03-07 LAB — URINE CULTURE, ROUTINE: NORMAL

## 2025-03-24 RX ORDER — TIRZEPATIDE 15 MG/.5ML
15 INJECTION, SOLUTION SUBCUTANEOUS WEEKLY
Qty: 2 ML | Refills: 5 | Status: SHIPPED | OUTPATIENT
Start: 2025-03-24 | End: 2025-04-23

## 2025-04-15 ENCOUNTER — OFFICE VISIT (OUTPATIENT)
Age: 55
End: 2025-04-15

## 2025-04-15 VITALS
BODY MASS INDEX: 45.75 KG/M2 | WEIGHT: 268 LBS | HEART RATE: 83 BPM | DIASTOLIC BLOOD PRESSURE: 92 MMHG | TEMPERATURE: 97.8 F | RESPIRATION RATE: 18 BRPM | OXYGEN SATURATION: 97 % | SYSTOLIC BLOOD PRESSURE: 155 MMHG | HEIGHT: 64 IN

## 2025-04-15 DIAGNOSIS — J01.40 ACUTE NON-RECURRENT PANSINUSITIS: Primary | ICD-10-CM

## 2025-04-15 DIAGNOSIS — R03.0 ELEVATED BP WITHOUT DIAGNOSIS OF HYPERTENSION: ICD-10-CM

## 2025-04-15 RX ORDER — METHYLPREDNISOLONE 4 MG/1
4 TABLET ORAL SEE ADMIN INSTRUCTIONS
Qty: 1 KIT | Refills: 0 | Status: SHIPPED | OUTPATIENT
Start: 2025-04-15

## 2025-04-15 ASSESSMENT — ENCOUNTER SYMPTOMS
RESPIRATORY NEGATIVE: 1
GASTROINTESTINAL NEGATIVE: 1
SINUS PRESSURE: 1
ALLERGIC/IMMUNOLOGIC NEGATIVE: 1
EYES NEGATIVE: 1

## 2025-04-15 NOTE — PROGRESS NOTES
[facility]  [address] [place] [address]  Phone: [phone]  Fax: [phone]      Heidi Barrios  1970  MRN: [id number]  Date of visit: 4/15/2025    Chief Complaint:     Heidi Barrios (:  1970) is a 54 y.o. female,New patient, here for evaluation of the following chief complaint(s):  Sinusitis and Ear Pain      Allergies   Allergen Reactions    Latex Rash     Other reaction(s): Unknown    Red Dye #40 (Allura Red) Other (See Comments)     Itching rash    Trovafloxacin Itching and Nausea Only    Citalopram Itching and Rash     celexa     Codeine Diarrhea, Other (See Comments) and Nausea And Vomiting    Hydrocodone Hives and Rash        Current Outpatient Medications   Medication Sig Dispense Refill    amoxicillin-clavulanate (AUGMENTIN) 875-125 MG per tablet Take 1 tablet by mouth 2 times daily for 14 days 28 tablet 0    methylPREDNISolone (MEDROL DOSEPACK) 4 MG tablet Take 1 tablet by mouth See Admin Instructions Take by mouth. 1 kit 0    MOUNJARO 15 MG/0.5ML SOAJ Inject 15 mg into the skin once a week 2 mL 5    WEGOVY 0.25 MG/0.5ML SOAJ SC injection Inject 0.25 mg into the skin every 7 days 2 mL 1    MOUNJARO 12.5 MG/0.5ML SOAJ Inject 12.5 mg into the skin once a week 2 mL 0    ARIPiprazole (ABILIFY) 5 MG tablet Take 1 tablet by mouth daily 30 tablet 3    MOUNJARO 7.5 MG/0.5ML SOAJ Inject 7.5 mg into the skin once a week 2 mL 0    predniSONE (DELTASONE) 20 MG tablet 1 p.o. T.i.d. x2 days, 1 p.o. B.i.d. x2 days, 1 p.o. Q. Day x2 days dispense quantity suff 12 tablet 0    ARIPiprazole (ABILIFY) 2 MG tablet Take 1 tablet by mouth daily 30 tablet 5    Prenatal Vit-Fe Fumarate-FA (WESTAB PLUS) 27-1 MG TABS TAKE 1 TABLET AT BEDTIME 28 tablet 5    vitamin D (ERGOCALCIFEROL) 1.25 MG (15406 UT) CAPS capsule TAKE (1) CAPSULE ON MONDAY, WEDNESDAY & FRIDAY. 12 capsule 5    FLUoxetine (PROZAC) 20 MG capsule TAKE (3) CAPSULES EVERY MORNING 84 capsule 5    atorvastatin

## 2025-06-09 ENCOUNTER — OFFICE VISIT (OUTPATIENT)
Age: 55
End: 2025-06-09

## 2025-06-09 VITALS
TEMPERATURE: 98.5 F | BODY MASS INDEX: 46.95 KG/M2 | WEIGHT: 265 LBS | SYSTOLIC BLOOD PRESSURE: 139 MMHG | HEART RATE: 68 BPM | HEIGHT: 63 IN | OXYGEN SATURATION: 96 % | RESPIRATION RATE: 18 BRPM | DIASTOLIC BLOOD PRESSURE: 82 MMHG

## 2025-06-09 DIAGNOSIS — N30.01 ACUTE CYSTITIS WITH HEMATURIA: Primary | ICD-10-CM

## 2025-06-09 LAB
BILIRUBIN, POC: NEGATIVE
BLOOD URINE, POC: NORMAL
CLARITY, POC: CLEAR
COLOR, POC: YELLOW
GLUCOSE URINE, POC: NEGATIVE MG/DL
KETONES, POC: NEGATIVE MG/DL
LEUKOCYTE EST, POC: NORMAL
NITRITE, POC: NEGATIVE
PH, POC: 7
PROTEIN, POC: NORMAL MG/DL
SPECIFIC GRAVITY, POC: 1.02
UROBILINOGEN, POC: NEGATIVE MG/DL

## 2025-06-09 RX ORDER — SULFAMETHOXAZOLE AND TRIMETHOPRIM 800; 160 MG/1; MG/1
1 TABLET ORAL 2 TIMES DAILY
Qty: 14 TABLET | Refills: 0 | Status: SHIPPED | OUTPATIENT
Start: 2025-06-09 | End: 2025-06-16

## 2025-06-09 ASSESSMENT — ENCOUNTER SYMPTOMS
ABDOMINAL DISTENTION: 0
ABDOMINAL PAIN: 0

## 2025-06-09 NOTE — PROGRESS NOTES
[facility]  [address] [place] [address]  Phone: [phone]  Fax: [phone]      Heidi Barrios  1970  MRN: [id number]  Date of visit: 2025    Chief Complaint:     Heidi Barrios (:  1970) is a 55 y.o. female,New patient, here for evaluation of the following chief complaint(s):  Urinary Tract Infection (5 days)      Allergies   Allergen Reactions    Latex Rash     Other reaction(s): Unknown    Red Dye #40 (Allura Red) Other (See Comments)     Itching rash    Trovafloxacin Itching and Nausea Only    Citalopram Itching and Rash     celexa     Codeine Diarrhea, Other (See Comments) and Nausea And Vomiting    Hydrocodone Hives and Rash        Current Outpatient Medications   Medication Sig Dispense Refill    sulfamethoxazole-trimethoprim (BACTRIM DS;SEPTRA DS) 800-160 MG per tablet Take 1 tablet by mouth 2 times daily for 7 days 14 tablet 0    methylPREDNISolone (MEDROL DOSEPACK) 4 MG tablet Take 1 tablet by mouth See Admin Instructions Take by mouth. 1 kit 0    MOUNJARO 15 MG/0.5ML SOAJ Inject 15 mg into the skin once a week 2 mL 5    WEGOVY 0.25 MG/0.5ML SOAJ SC injection Inject 0.25 mg into the skin every 7 days 2 mL 1    MOUNJARO 12.5 MG/0.5ML SOAJ Inject 12.5 mg into the skin once a week 2 mL 0    ARIPiprazole (ABILIFY) 5 MG tablet Take 1 tablet by mouth daily 30 tablet 3    MOUNJARO 7.5 MG/0.5ML SOAJ Inject 7.5 mg into the skin once a week 2 mL 0    predniSONE (DELTASONE) 20 MG tablet 1 p.o. T.i.d. x2 days, 1 p.o. B.i.d. x2 days, 1 p.o. Q. Day x2 days dispense quantity suff 12 tablet 0    ARIPiprazole (ABILIFY) 2 MG tablet Take 1 tablet by mouth daily 30 tablet 5    Prenatal Vit-Fe Fumarate-FA (WESTAB PLUS) 27-1 MG TABS TAKE 1 TABLET AT BEDTIME 28 tablet 5    vitamin D (ERGOCALCIFEROL) 1.25 MG (47927 UT) CAPS capsule TAKE (1) CAPSULE ON MONDAY, WEDNESDAY & FRIDAY. 12 capsule 5    FLUoxetine (PROZAC) 20 MG capsule TAKE (3) CAPSULES EVERY MORNING 84

## 2025-06-21 ENCOUNTER — OFFICE VISIT (OUTPATIENT)
Age: 55
End: 2025-06-21

## 2025-06-21 VITALS
HEART RATE: 85 BPM | OXYGEN SATURATION: 97 % | WEIGHT: 270 LBS | HEIGHT: 64 IN | RESPIRATION RATE: 18 BRPM | TEMPERATURE: 97.3 F | DIASTOLIC BLOOD PRESSURE: 73 MMHG | SYSTOLIC BLOOD PRESSURE: 107 MMHG | BODY MASS INDEX: 46.1 KG/M2

## 2025-06-21 DIAGNOSIS — R35.0 URINARY FREQUENCY: ICD-10-CM

## 2025-06-21 DIAGNOSIS — N30.01 ACUTE CYSTITIS WITH HEMATURIA: Primary | ICD-10-CM

## 2025-06-21 DIAGNOSIS — R39.15 URINARY URGENCY: ICD-10-CM

## 2025-06-21 DIAGNOSIS — R30.0 DYSURIA: ICD-10-CM

## 2025-06-21 LAB
BILIRUBIN, POC: NEGATIVE
BLOOD URINE, POC: 1
CLARITY, POC: CLEAR
COLOR, POC: NORMAL
GLUCOSE URINE, POC: NEGATIVE MG/DL
KETONES, POC: NEGATIVE MG/DL
LEUKOCYTE EST, POC: 1
NITRITE, POC: NEGATIVE
PH, POC: 5
PROTEIN, POC: NORMAL MG/DL
SPECIFIC GRAVITY, POC: 1.03
UROBILINOGEN, POC: NEGATIVE MG/DL

## 2025-06-21 RX ORDER — CEFUROXIME AXETIL 500 MG/1
500 TABLET ORAL 2 TIMES DAILY
Qty: 14 TABLET | Refills: 0 | Status: SHIPPED | OUTPATIENT
Start: 2025-06-21 | End: 2025-06-28

## 2025-06-21 RX ORDER — CEFUROXIME AXETIL 500 MG/1
500 TABLET ORAL 2 TIMES DAILY
Qty: 14 TABLET | Refills: 0 | Status: SHIPPED | OUTPATIENT
Start: 2025-06-21 | End: 2025-06-21 | Stop reason: SDUPTHER

## 2025-06-21 ASSESSMENT — ENCOUNTER SYMPTOMS
ALLERGIC/IMMUNOLOGIC NEGATIVE: 1
RESPIRATORY NEGATIVE: 1
EYES NEGATIVE: 1
ABDOMINAL PAIN: 1

## 2025-06-21 NOTE — PROGRESS NOTES
[facility]  [address] [place] [address]  Phone: [phone]  Fax: [phone]      Heidi Barrios  1970  MRN: [id number]  Date of visit: 2025    Chief Complaint:     Heidi Barrios (:  1970) is a 55 y.o. female,Established patient, here for evaluation of the following chief complaint(s):  Urinary Tract Infection      Allergies   Allergen Reactions    Latex Rash     Other reaction(s): Unknown    Red Dye #40 (Allura Red) Other (See Comments)     Itching rash    Trovafloxacin Itching and Nausea Only    Citalopram Itching and Rash     celexa     Codeine Diarrhea, Other (See Comments) and Nausea And Vomiting    Hydrocodone Hives and Rash        Current Outpatient Medications   Medication Sig Dispense Refill    cefUROXime (CEFTIN) 500 MG tablet Take 1 tablet by mouth 2 times daily for 7 days 14 tablet 0    methylPREDNISolone (MEDROL DOSEPACK) 4 MG tablet Take 1 tablet by mouth See Admin Instructions Take by mouth. 1 kit 0    MOUNJARO 15 MG/0.5ML SOAJ Inject 15 mg into the skin once a week 2 mL 5    WEGOVY 0.25 MG/0.5ML SOAJ SC injection Inject 0.25 mg into the skin every 7 days 2 mL 1    MOUNJARO 12.5 MG/0.5ML SOAJ Inject 12.5 mg into the skin once a week 2 mL 0    ARIPiprazole (ABILIFY) 5 MG tablet Take 1 tablet by mouth daily 30 tablet 3    MOUNJARO 7.5 MG/0.5ML SOAJ Inject 7.5 mg into the skin once a week 2 mL 0    predniSONE (DELTASONE) 20 MG tablet 1 p.o. T.i.d. x2 days, 1 p.o. B.i.d. x2 days, 1 p.o. Q. Day x2 days dispense quantity suff 12 tablet 0    ARIPiprazole (ABILIFY) 2 MG tablet Take 1 tablet by mouth daily 30 tablet 5    Prenatal Vit-Fe Fumarate-FA (WESTAB PLUS) 27-1 MG TABS TAKE 1 TABLET AT BEDTIME 28 tablet 5    vitamin D (ERGOCALCIFEROL) 1.25 MG (08781 UT) CAPS capsule TAKE (1) CAPSULE ON MONDAY, WEDNESDAY & FRIDAY. 12 capsule 5    FLUoxetine (PROZAC) 20 MG capsule TAKE (3) CAPSULES EVERY MORNING 84 capsule 5    atorvastatin (LIPITOR) 10 MG

## 2025-06-30 RX ORDER — PNV,CALCIUM 72/IRON/FOLIC ACID 27 MG-1 MG
1 TABLET ORAL NIGHTLY
Qty: 28 TABLET | Refills: 0 | Status: SHIPPED | OUTPATIENT
Start: 2025-06-30

## 2025-06-30 RX ORDER — LEVOTHYROXINE SODIUM 50 UG/1
TABLET ORAL
Qty: 112 TABLET | Refills: 0 | Status: SHIPPED | OUTPATIENT
Start: 2025-06-30

## 2025-06-30 RX ORDER — ATORVASTATIN CALCIUM 10 MG/1
10 TABLET, FILM COATED ORAL NIGHTLY
Qty: 28 TABLET | Refills: 0 | Status: SHIPPED | OUTPATIENT
Start: 2025-06-30

## 2025-07-09 ENCOUNTER — OFFICE VISIT (OUTPATIENT)
Dept: FAMILY MEDICINE CLINIC | Age: 55
End: 2025-07-09
Payer: COMMERCIAL

## 2025-07-09 VITALS
HEIGHT: 64 IN | DIASTOLIC BLOOD PRESSURE: 66 MMHG | SYSTOLIC BLOOD PRESSURE: 131 MMHG | OXYGEN SATURATION: 98 % | BODY MASS INDEX: 43.6 KG/M2 | WEIGHT: 255.4 LBS | HEART RATE: 75 BPM

## 2025-07-09 DIAGNOSIS — E66.813 CLASS 3 SEVERE OBESITY DUE TO EXCESS CALORIES WITH SERIOUS COMORBIDITY AND BODY MASS INDEX (BMI) OF 50.0 TO 59.9 IN ADULT (HCC): ICD-10-CM

## 2025-07-09 DIAGNOSIS — Z00.00 WELL ADULT EXAM: ICD-10-CM

## 2025-07-09 DIAGNOSIS — E11.69 TYPE 2 DIABETES MELLITUS WITH OTHER SPECIFIED COMPLICATION, WITHOUT LONG-TERM CURRENT USE OF INSULIN (HCC): ICD-10-CM

## 2025-07-09 DIAGNOSIS — N30.01 ACUTE CYSTITIS WITH HEMATURIA: Primary | ICD-10-CM

## 2025-07-09 LAB
BILIRUBIN, POC: NORMAL
BLOOD URINE, POC: NORMAL
CLARITY, POC: CLEAR
COLOR, POC: NORMAL
GLUCOSE URINE, POC: NORMAL MG/DL
KETONES, POC: NORMAL MG/DL
LEUKOCYTE EST, POC: NORMAL
NITRITE, POC: NORMAL
PH, POC: 5.5
PROTEIN, POC: 30 MG/DL
SPECIFIC GRAVITY, POC: >=1.03
UROBILINOGEN, POC: 1 MG/DL

## 2025-07-09 PROCEDURE — 3044F HG A1C LEVEL LT 7.0%: CPT | Performed by: FAMILY MEDICINE

## 2025-07-09 PROCEDURE — 81003 URINALYSIS AUTO W/O SCOPE: CPT | Performed by: FAMILY MEDICINE

## 2025-07-09 PROCEDURE — 99214 OFFICE O/P EST MOD 30 MIN: CPT | Performed by: FAMILY MEDICINE

## 2025-07-09 RX ORDER — TIRZEPATIDE 15 MG/.5ML
15 INJECTION, SOLUTION SUBCUTANEOUS WEEKLY
Qty: 2 ML | Refills: 5 | Status: SHIPPED | OUTPATIENT
Start: 2025-07-09 | End: 2025-08-08

## 2025-07-09 RX ORDER — CIPROFLOXACIN 500 MG/1
500 TABLET, FILM COATED ORAL 2 TIMES DAILY
Qty: 20 TABLET | Refills: 0 | Status: SHIPPED | OUTPATIENT
Start: 2025-07-09 | End: 2025-07-19

## 2025-07-09 ASSESSMENT — PATIENT HEALTH QUESTIONNAIRE - PHQ9
SUM OF ALL RESPONSES TO PHQ QUESTIONS 1-9: 0
SUM OF ALL RESPONSES TO PHQ QUESTIONS 1-9: 0
1. LITTLE INTEREST OR PLEASURE IN DOING THINGS: NOT AT ALL
2. FEELING DOWN, DEPRESSED OR HOPELESS: NOT AT ALL
SUM OF ALL RESPONSES TO PHQ QUESTIONS 1-9: 0
SUM OF ALL RESPONSES TO PHQ QUESTIONS 1-9: 0

## 2025-07-09 NOTE — PROGRESS NOTES
[facility]  [facility] MEDICINE  [address]  [address]  [facility] [address]  Dept: [phone]      Heidi Barrios is a 55 y.o. female who presents today for follow up on her  medical conditions as noted below.      Chief Complaint   Patient presents with    Dysuria    Urinary Frequency    Chills       Patient Active Problem List:     Anxiety     Morbid obesity (HCC)     History reviewed. No pertinent past medical history.   No past surgical history on file.  No family history on file.    Current Outpatient Medications   Medication Sig Dispense Refill    ciprofloxacin (CIPRO) 500 MG tablet Take 1 tablet by mouth 2 times daily for 10 days 20 tablet 0    MOUNJARO 15 MG/0.5ML SOAJ pen Inject 15 mg into the skin once a week 2 mL 5    metFORMIN (GLUCOPHAGE) 500 MG tablet TAKE (1) TABLET EVERY MORNING AND BEDTIME 56 tablet 0    levothyroxine (SYNTHROID) 50 MCG tablet TAKE (4) TABLETS EVERY MORNING 112 tablet 0    atorvastatin (LIPITOR) 10 MG tablet TAKE 1 TABLET AT BEDTIME 28 tablet 0    FLUoxetine (PROZAC) 20 MG capsule TAKE (3) CAPSULES EVERY MORNING 84 capsule 0    Prenatal Vit-Fe Fumarate-FA (WESTAB PLUS) 27-1 MG TABS TAKE 1 TABLET AT BEDTIME 28 tablet 0    ARIPiprazole (ABILIFY) 5 MG tablet Take 1 tablet by mouth daily 30 tablet 3    vitamin D (ERGOCALCIFEROL) 1.25 MG (68817 UT) CAPS capsule TAKE (1) CAPSULE ON MONDAY, WEDNESDAY & FRIDAY. 12 capsule 5    NASCOBAL 500 MCG/0.1ML SOLN nasal spray instill 1 spray into each nostril every week 3 each 3    traZODone (DESYREL) 50 MG tablet Take 1 tablet by mouth nightly as needed for Sleep 90 tablet 3    MOUNJARO 15 MG/0.5ML SOPN SC injection Inject 0.5 mLs into the skin once a week 12 Adjustable Dose Pre-filled Pen Syringe 3    ondansetron (ZOFRAN) 8 MG tablet Take 1 tablet by mouth every 8 hours as needed for Nausea or Vomiting 30 tablet 0    famotidine (PEPCID) 20 MG tablet Take 1 tablet by mouth 2 times daily 180 tablet 3

## 2025-07-10 RX ORDER — FLUCONAZOLE 150 MG/1
150 TABLET ORAL ONCE
Qty: 2 TABLET | Refills: 2 | Status: SHIPPED | OUTPATIENT
Start: 2025-07-10 | End: 2025-07-10

## 2025-07-10 RX ORDER — ARIPIPRAZOLE 10 MG/1
10 TABLET ORAL DAILY
Qty: 90 TABLET | Refills: 3 | Status: SHIPPED | OUTPATIENT
Start: 2025-07-10

## 2025-07-24 NOTE — TELEPHONE ENCOUNTER
Heidi Barrios is calling to request a refill on the following medication(s):    Last Visit Date (If Applicable):  7/9/2025    Next Visit Date:    Visit date not found    Medication Request:  Requested Prescriptions     Pending Prescriptions Disp Refills    Prenatal Vit-Fe Fumarate-FA (WESTAB PLUS) 27-1 MG TABS [Pharmacy Med Name: WESTAB PLUS TABLET] 28 tablet 0     Sig: TAKE 1 TABLET AT BEDTIME    FLUoxetine (PROZAC) 20 MG capsule [Pharmacy Med Name: FLUOXETINE HCL 20 MG CAPSULE] 84 capsule 0     Sig: TAKE (3) CAPSULES EVERY MORNING    atorvastatin (LIPITOR) 10 MG tablet [Pharmacy Med Name: ATORVASTATIN 10 MG TABLET] 28 tablet 0     Sig: TAKE 1 TABLET AT BEDTIME    levothyroxine (SYNTHROID) 50 MCG tablet [Pharmacy Med Name: LEVOTHYROXINE 50 MCG TABLET] 112 tablet 0     Sig: TAKE (4) TABLETS EVERY MORNING    metFORMIN (GLUCOPHAGE) 500 MG tablet [Pharmacy Med Name: METFORMIN  MG TABLET] 56 tablet 0     Sig: TAKE (1) TABLET EVERY MORNING AND BEDTIME

## 2025-07-25 RX ORDER — LEVOTHYROXINE SODIUM 50 UG/1
TABLET ORAL
Qty: 112 TABLET | Refills: 0 | Status: SHIPPED | OUTPATIENT
Start: 2025-07-25

## 2025-07-25 RX ORDER — PNV,CALCIUM 72/IRON/FOLIC ACID 27 MG-1 MG
1 TABLET ORAL NIGHTLY
Qty: 28 TABLET | Refills: 0 | Status: SHIPPED | OUTPATIENT
Start: 2025-07-25

## 2025-07-25 RX ORDER — ATORVASTATIN CALCIUM 10 MG/1
10 TABLET, FILM COATED ORAL NIGHTLY
Qty: 28 TABLET | Refills: 0 | Status: SHIPPED | OUTPATIENT
Start: 2025-07-25

## 2025-08-21 DIAGNOSIS — F41.9 ANXIETY: ICD-10-CM

## 2025-08-21 RX ORDER — TRAZODONE HYDROCHLORIDE 50 MG/1
TABLET ORAL
Qty: 28 TABLET | Refills: 0 | Status: SHIPPED | OUTPATIENT
Start: 2025-08-21

## 2025-08-21 RX ORDER — ATORVASTATIN CALCIUM 10 MG/1
10 TABLET, FILM COATED ORAL NIGHTLY
Qty: 28 TABLET | Refills: 0 | Status: SHIPPED | OUTPATIENT
Start: 2025-08-21

## 2025-08-21 RX ORDER — LEVOTHYROXINE SODIUM 50 UG/1
TABLET ORAL
Qty: 112 TABLET | Refills: 0 | Status: SHIPPED | OUTPATIENT
Start: 2025-08-21

## 2025-08-21 RX ORDER — PNV,CALCIUM 72/IRON/FOLIC ACID 27 MG-1 MG
1 TABLET ORAL NIGHTLY
Qty: 28 TABLET | Refills: 0 | Status: SHIPPED | OUTPATIENT
Start: 2025-08-21

## 2025-08-26 ENCOUNTER — PATIENT MESSAGE (OUTPATIENT)
Dept: FAMILY MEDICINE CLINIC | Age: 55
End: 2025-08-26

## 2025-08-26 DIAGNOSIS — E03.9 ACQUIRED HYPOTHYROIDISM: Primary | ICD-10-CM

## 2025-08-30 RX ORDER — FLUOXETINE 10 MG/1
10 CAPSULE ORAL DAILY
Qty: 30 CAPSULE | Refills: 3 | Status: SHIPPED | OUTPATIENT
Start: 2025-08-30